# Patient Record
Sex: MALE | Race: BLACK OR AFRICAN AMERICAN | Employment: OTHER | URBAN - METROPOLITAN AREA
[De-identification: names, ages, dates, MRNs, and addresses within clinical notes are randomized per-mention and may not be internally consistent; named-entity substitution may affect disease eponyms.]

---

## 2022-11-16 ENCOUNTER — APPOINTMENT (OUTPATIENT)
Dept: CT IMAGING | Age: 82
End: 2022-11-16
Payer: COMMERCIAL

## 2022-11-16 ENCOUNTER — HOSPITAL ENCOUNTER (EMERGENCY)
Age: 82
Discharge: HOME | End: 2022-11-16
Attending: EMERGENCY MEDICINE
Payer: COMMERCIAL

## 2022-11-16 VITALS
SYSTOLIC BLOOD PRESSURE: 139 MMHG | DIASTOLIC BLOOD PRESSURE: 90 MMHG | OXYGEN SATURATION: 98 % | TEMPERATURE: 97.8 F | HEART RATE: 76 BPM | WEIGHT: 190.4 LBS | RESPIRATION RATE: 21 BRPM
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
## 2022-11-16 DIAGNOSIS — S02.2XXA CLOSED FRACTURE OF NASAL BONE, INITIAL ENCOUNTER: Primary | ICD-10-CM

## 2022-11-16 PROCEDURE — 99284 EMERGENCY DEPT VISIT MOD MDM: CPT

## 2022-11-16 PROCEDURE — 70450 CT HEAD/BRAIN W/O DYE: CPT

## 2022-11-16 PROCEDURE — 70486 CT MAXILLOFACIAL W/O DYE: CPT

## 2022-11-16 PROCEDURE — 72125 CT NECK SPINE W/O DYE: CPT

## 2022-11-16 RX ORDER — CHLORTHALIDONE 25 MG/1
25 TABLET ORAL DAILY
COMMUNITY

## 2022-11-16 RX ORDER — LISINOPRIL 40 MG/1
40 TABLET ORAL DAILY
COMMUNITY

## 2022-11-16 RX ORDER — DORZOLAMIDE HYDROCHLORIDE AND TIMOLOL MALEATE 20; 5 MG/ML; MG/ML
1 SOLUTION/ DROPS OPHTHALMIC 2 TIMES DAILY
COMMUNITY

## 2022-11-16 RX ORDER — HYDRALAZINE HYDROCHLORIDE 10 MG/1
10 TABLET, FILM COATED ORAL 3 TIMES DAILY
COMMUNITY

## 2022-11-16 RX ORDER — FOLIC ACID 1 MG/1
1 TABLET ORAL DAILY
COMMUNITY

## 2022-11-16 RX ORDER — AMLODIPINE BESYLATE 10 MG/1
10 TABLET ORAL DAILY
COMMUNITY

## 2022-11-16 RX ORDER — INSULIN GLARGINE 100 [IU]/ML
INJECTION, SOLUTION SUBCUTANEOUS NIGHTLY
COMMUNITY

## 2022-11-16 ASSESSMENT — PAIN - FUNCTIONAL ASSESSMENT: PAIN_FUNCTIONAL_ASSESSMENT: 0-10

## 2022-11-16 ASSESSMENT — PAIN SCALES - GENERAL: PAINLEVEL_OUTOF10: 0

## 2022-11-16 NOTE — DISCHARGE INSTRUCTIONS
Thank you for choosing [facility] for your emergency care today. We take a lot of pride in the fact that you chose us for your care and we strived to do everything necessary to provide you with excellent medical care. We hope you agree that you received excellent care today. To continue that excellent medical care, the following information very important that you read and understand before you leave the emergency department today. It contains information about:  Your diagnosis  What was done for you in the emergency department  What you can expect from your illness or injury moving forward  The steps you will need to take after leaving the emergency department to help achieve the best possible outcome for your illness or injury. What we did in the Emergency Department Today:     You were seen in the Emergency Department today by Antonio Parker PA-C. You had the following lab abnormalities:  Labs Reviewed - No data to display    If no result appears for the test, then it was within normal limits. If the test is pending, the hospital will shira you if the results are abnormal as soon as the test is completed. You had the following diagnostic imaging:  CT CERVICAL SPINE WO CONTRAST   Final Result      1. No sign of any acute or active sinusitis. Orbits remain intact      2. Comminuted nasal bone fractures with overlying soft tissue swelling. The visualized portions of the mandible and mandibular condyles remain intact and slight anterior translation noted bilaterally, correlate clinically. This could be positional with    partial opening of the mouth. CT scan of the CERVICAL SPINE on 11/16/2022      HISTORY: Neck pain fell, on anticoagulants, nasal bone fractures, evaluate for fracture, disc herniation or spinal canal stenosis.       PROCEDURE: Dose modulation, radiation reducing techniques and iterative reconstruction techniques utilized to reduce radiation exposure with up-to-date CT equipment. CT scan of the cervical spine was performed utilizing contiguous 2.5 cm axial sections with multiple sagittal and coronal reconstructed sequences. These were reviewed under separate computerized work station. COMPARISON: None      FINDINGS: There is abnormal alignment of the cervical vertebral bodies with retrolisthesis of C3 on C4 with remodeling sclerosis and advanced disc space loss. There is borderline retrolisthesis C5 on C6 as well with advanced disc space loss and    spondylosis at C5-6 C6-7 and C7-T1 levels. No discrete vertebral Fracture. The skull base appears intact. Examination of the individual disk spaces reveals no sign of any acute bony defect or cord compression. The C2-C3 disc space level reveals spondylosis and right greater the left facet hypertrophy without central canal compromise. At  C3-C4, , retrolisthesis and mixed spondylotic protrusion noted with right groin left facet hypertrophy. Severe vascular calcifications also noted at the carotid bifurcations. At C4-C5, there is some small central partially calcified spondylotic protrusion and right greater the left facet hypertrophy without significant foraminal compromise. At C5-6, C6-C7 and C7-T1 disc space levels, diffuse endplate spondylosis sclerosis and severely narrowed without definitive acute abnormalities. Uncinate foraminal spurring on the left is noted at C5-6 and left paracentral at C6-7 with facet hypertrophy    bilaterally. IMPRESSION:       1. No acute fracture with advanced disc degeneration, bony overgrowth subchondral sclerosis and deformities as described above   2. Retrolisthesis of C3 on C4 and C5 on C6 with severe disc space loss and spondylosis C3-4 C5-6 C6-7 and C7-T1 levels   3. Facet hypertrophy         CT FACIAL BONES WO CONTRAST   Final Result      1. No sign of any acute or active sinusitis. Orbits remain intact      2.  Comminuted nasal bone fractures with overlying soft tissue swelling. The visualized portions of the mandible and mandibular condyles remain intact and slight anterior translation noted bilaterally, correlate clinically. This could be positional with    partial opening of the mouth. CT scan of the CERVICAL SPINE on 11/16/2022      HISTORY: Neck pain fell, on anticoagulants, nasal bone fractures, evaluate for fracture, disc herniation or spinal canal stenosis. PROCEDURE: Dose modulation, radiation reducing techniques and iterative reconstruction techniques utilized to reduce radiation exposure with up-to-date CT equipment. CT scan of the cervical spine was performed utilizing contiguous 2.5 cm axial sections with multiple sagittal and coronal reconstructed sequences. These were reviewed under separate computerized work station. COMPARISON: None      FINDINGS: There is abnormal alignment of the cervical vertebral bodies with retrolisthesis of C3 on C4 with remodeling sclerosis and advanced disc space loss. There is borderline retrolisthesis C5 on C6 as well with advanced disc space loss and    spondylosis at C5-6 C6-7 and C7-T1 levels. No discrete vertebral Fracture. The skull base appears intact. Examination of the individual disk spaces reveals no sign of any acute bony defect or cord compression. The C2-C3 disc space level reveals spondylosis and right greater the left facet hypertrophy without central canal compromise. At  C3-C4, , retrolisthesis and mixed spondylotic protrusion noted with right groin left facet hypertrophy. Severe vascular calcifications also noted at the carotid bifurcations. At C4-C5, there is some small central partially calcified spondylotic protrusion and right greater the left facet hypertrophy without significant foraminal compromise.       At C5-6, C6-C7 and C7-T1 disc space levels, diffuse endplate spondylosis sclerosis and severely narrowed without definitive acute abnormalities. Uncinate foraminal spurring on the left is noted at C5-6 and left paracentral at C6-7 with facet hypertrophy    bilaterally. IMPRESSION:       1. No acute fracture with advanced disc degeneration, bony overgrowth subchondral sclerosis and deformities as described above   2. Retrolisthesis of C3 on C4 and C5 on C6 with severe disc space loss and spondylosis C3-4 C5-6 C6-7 and C7-T1 levels   3. Facet hypertrophy         CT HEAD WO CONTRAST   Final Result      Moderate cerebral atrophy and ventricular dilation of the brain without acute hemorrhage, edema or hydrocephalus. You were given the following medications during your stay in the Emergency Department:  No orders of the defined types were placed in this encounter. You were diagnosed with the followin. Closed fracture of nasal bone, initial encounter    2. Fall from slip, trip, or stumble, initial encounter        IMPORTANT: If your condition worsens, or your development symptoms that you find concerning and want to have checked out immediately, do not hesitate to return to the Emergency Department. You can call  and have trained Emergency Medical Service providers bring you to the emergency department if you can't do so safely and quickly. They can begin the medical evaluation, on scene, wherever you are located and can begin critical medical care on the way to the emergency.     Signs and symptoms that should always cause concern and be evaluated urgently or in the Emergency Department:  Going Unconscious  Dizziness, lightheadedness like you are going to faint, confusion, loss of balance or new clumsiness  New Seizures  Significant Head Injury  Eye Injuries or new vision changes  Severe Nausea and Vomiting that prevents you from eating, drinking and/or taking your medications  Significant or persistent fevers (Above 100.3 F in babies, over 80 F in adults, as an example)  Chest Pain  Shortness of Breath  Sudden, severe pain  Cuts that won't stop bleeding  Significant [place]  Bleeding during Pregnancy in women  Testicular Pain in men    Your Plan of Care after leaving our Emergency Department     You should read the following instructions before leaving the Emergency Department. If you have any questions about this Plan of Care, please don't hesitate to ask. It is important that you understand this Plan of Care so that you can achieve the best possible outcome from your illness or injury. IMPORTANT INSTRUCTIONS:      You should follow-up with:  Augustine Langley MD  [address] [place] [address] Alaina Villarreal [address]    Schedule an appointment as soon as possible for a visit in 2 weeks  if nose continues to be deformed or if there is any trouble breathing through the nose    [facility], [facility]. Emergency [address]  [phone]  Go to   If symptoms worsen      You should call the number of the providers listed above to schedule follow-up appointments in the timeline recommended or to speak to a healthcare provider. These providers also have on-call clinicians available after hours and on weekends for urgent questions and can be reached by calling the same number. If you feel your issue is an emergency, please don't hesitate to return to the emergency department for evaluation. If you can not safely and quickly get yourself to the emergency department, call 9-1-1 and have trained Emergency Medical Service providers bring you to the emergency department. They can begin the medical evaluation, on scene, wherever you are located and can begin critical medical care on the way to the emergency department while in the ambulance. Even if not listed above, you should always call your Primary Care Provider after a visit to the Emergency Department.  They will want to know what your emergency was so they can best figure out how to continue to coordinate your care moving forward. Your Primary Care Provider is responsible for coordinating and tracking your health and medical care. You should keep them informed regularly of any and all new medical conditions, changes to your medications or other information pertinent to your health. DISCHARGE MEDICATIONS:  The following medications were prescribed for the you during this visit. Take them as directed below:     Current Discharge Medication List          These home medications were modified or discontinued during this visit (be sure you discuss these modifications with your primary care or prescribing physician as soon as possible):   Current Discharge Medication List        Current Discharge Medication List           You should continue to take the following home medications as prescribed by your physician:   Current Discharge Medication List        CONTINUE these medications which have NOT CHANGED    Details   amLODIPine (NORVASC) 10 MG tablet Take 10 mg by mouth daily      apixaban (ELIQUIS) 5 MG TABS tablet Take 5 mg by mouth 2 times daily Take two tablets by mouth twice a day for 3 days.       chlorthalidone (HYGROTON) 25 MG tablet Take 25 mg by mouth daily      empagliflozin (JARDIANCE) 25 MG tablet Take 25 mg by mouth daily      folic acid (FOLVITE) 1 MG tablet Take 1 mg by mouth daily      hydrALAZINE (APRESOLINE) 10 MG tablet Take 10 mg by mouth 3 times daily Take one half tablet by mouth three times a day fr blood pressure/heart      insulin glargine (LANTUS) 100 UNIT/ML injection vial Inject into the skin nightly      lisinopril (PRINIVIL;ZESTRIL) 40 MG tablet Take 40 mg by mouth daily      metFORMIN (GLUCOPHAGE) 1000 MG tablet Take 1,000 mg by mouth 2 times daily (with meals)      dorzolamide-timolol (COSOPT) 22.3-6.8 MG/ML ophthalmic solution 1 drop 2 times daily             Additional important information has been included within this packet, please make sure that you have read this information as it will better help you understand your illness/injury better, the danger signs to watch for and things you can do to improve your condition and health in the future.

## 2022-11-16 NOTE — ED PROVIDER NOTES
ED Attending Attestation Note     Date of evaluation: 11/16/2022    This patient was seen by the advance practice provider. I have seen and examined the patient, agree with the workup, evaluation, management and diagnosis. The care plan has been discussed. My assessment reveals complaints of injuries after fall. Patient has dementia so is unable to provide history. Patient does have swelling present to the nose but is at his mental status baseline per family. Will check imaging.        Robbi Murrieta MD  11/16/22 3422

## 2022-11-17 ASSESSMENT — ENCOUNTER SYMPTOMS
ABDOMINAL PAIN: 0
COLOR CHANGE: 0
DIARRHEA: 0
RHINORRHEA: 0
ABDOMINAL DISTENTION: 0
SHORTNESS OF BREATH: 0
SORE THROAT: 0
NAUSEA: 0
VOMITING: 0
WHEEZING: 0
EYE PAIN: 0
CHEST TIGHTNESS: 0
COUGH: 0
TROUBLE SWALLOWING: 0

## 2022-11-17 NOTE — ED PROVIDER NOTES
[address] [facility]          PHYSICIAN ASSISTANT NOTE       Date of evaluation: 11/16/2022    Chief Complaint     Fall (Pt states he was getting up trying to use the urinal and stumbled on it and fell. Abrasion on the left side of the bridge of the nose.)      History of Present Illness     Betsy Snellen is a 79 y.o. male with a past medical history as noted below who presents to the Emergency Department with a complaint of injuries from a fall. The patient is a resident of a skilled nursing facility. The patient reports that he was attempting to get up to use the bathroom when he stumbled over the urinal and fell. The fall was unwitnessed. The patient reports that he fell forward, striking his face and head against the wall, but was able to catch himself from falling all the way to the ground. He sustained a laceration over his nose with a nosebleed. Facility staff was notified who activated EMS as the patient is on Eliquis for the treatment of recent DVT provoked by fall. The patient denies any loss of consciousness. Hearing loss during EMS transport, the epistaxis stopped on its own. Denies any complaints of pain at this time. No recent chest pain, shortness of breath, lightheadedness, dizziness or disequilibrium. No recent fevers, chills, sweats or other constitutional symptoms. Review of Systems     Review of Systems   Constitutional:  Negative for chills, diaphoresis, fatigue and fever. HENT:  Positive for nosebleeds. Negative for congestion, postnasal drip, rhinorrhea, sore throat and trouble swallowing. Eyes:  Negative for pain and visual disturbance. Respiratory:  Negative for cough, chest tightness, shortness of breath and wheezing. Cardiovascular:  Negative for chest pain, palpitations and leg swelling. Gastrointestinal:  Negative for abdominal distention, abdominal pain, diarrhea, nausea and vomiting.    Endocrine: Negative for polydipsia and polyuria. Genitourinary:  Negative for dysuria. Musculoskeletal:  Negative for myalgias, neck pain and neck stiffness. Skin:  Negative for color change, pallor and rash. Neurological:  Negative for dizziness, seizures, weakness, light-headedness and headaches. Hematological:  Does not bruise/bleed easily. Psychiatric/Behavioral:  Negative for confusion, hallucinations, self-injury and suicidal ideas. All other systems reviewed and are negative. Physical Exam     INITIAL VITALS: BP: (!) 145/88, Temp: 97.8 °F (36.6 °C), Heart Rate: 77, Resp: 24, SpO2: 100 %     Nursing note and vitals reviewed. Physical Exam  Constitutional:       General: He is not in acute distress. HENT:      Head: Contusion and laceration present. No raccoon eyes or Tom's sign. Right Ear: Tympanic membrane and ear canal normal.      Left Ear: Tympanic membrane and ear canal normal.      Nose: Nasal deformity, signs of injury and laceration present. No septal deviation, mucosal edema or rhinorrhea. Right Nostril: No epistaxis or septal hematoma. Left Nostril: Epistaxis (Currently hemostatic) present. No septal hematoma. Mouth/Throat:      Mouth: No injury. Eyes:      Extraocular Movements: Extraocular movements intact. Pupils: Pupils are equal, round, and reactive to light. Cardiovascular:      Rate and Rhythm: Normal rate and regular rhythm. Pulmonary:      Breath sounds: No decreased air movement. No decreased breath sounds. Musculoskeletal:      Right shoulder: Normal.      Left shoulder: Normal.      Right upper arm: Normal.      Left upper arm: Normal.      Right elbow: Normal.      Left elbow: Normal.      Right forearm: Normal.      Left forearm: Normal.      Right wrist: Normal.      Left wrist: Normal.      Right hand: Normal.      Left hand: Normal.      Cervical back: No edema, signs of trauma, tenderness, bony tenderness or crepitus. No pain with movement.       Thoracic back: No edema, signs of trauma, tenderness or bony tenderness. Normal range of motion. Lumbar back: No edema, signs of trauma, tenderness or bony tenderness. Right hip: Normal.      Left hip: Normal.      Right upper leg: Normal.      Left upper leg: Normal.      Right knee: Normal.      Left knee: Normal.      Right lower leg: Normal.      Left lower leg: Normal.      Right ankle: Normal.      Right Achilles Tendon: Normal.      Left ankle: Normal.      Left Achilles Tendon: Normal.      Right foot: Normal.      Left foot: Normal.   Skin:     Findings: No bruising, burn, signs of injury or laceration. Neurological:      Mental Status: He is alert. GCS: GCS eye subscore is 4. GCS verbal subscore is 5. GCS motor subscore is 6. Sensory: No sensory deficit. Motor: No weakness or abnormal muscle tone. Deep Tendon Reflexes: Babinski sign absent on the right side. Babinski sign absent on the left side. Procedures     N/A    MEDICAL DECISION MAKING     Mercedes Shanks is admitted to the Emergency Department for evaluation of his chief complaint as described in the history of present illness. Complete history and physical was performed by me and my attending. Nursing notes, past medical history, surgical history, family history and social history were reviewed and addressed in the HPI. Jimmie Benavides is a 79 y.o. male who presents to the emergency department with a complaint of injuries from fall. The patient sustained a mechanical fall while walking to the bathroom, tripping over a urinal.  Patient remembers striking his head and face against the wall, but was able to stop himself from falling to the ground. Reported bleeding from the nose. The patient is currently on Eliquis for management of DVT provoked by prior fall. He also has a history of atrial fibrillation/flutter. Denies any complaints on arrival to the emergency department.     On emergency department arrival, the patient is hemodynamically stable and within normal limits. The patient is extremely hard of hearing, but does have hearing aids in bilaterally. He answers questions appropriately when he can hear them clearly. He presents with his daughter who was notified by the nursing facility staff of the patient's fall. She reports that the patient is acting at his baseline mentally. Physical examination reveals a laceration to the left medial aspect of the bridge of the nose with nasal deformity and evidence of recent epistaxis of the left naris. There is no evidence of septal hematoma. Dentition intact and without injury. No other cranial or facial injuries noted on physical examination. Head to toe traumatic evaluation demonstrates no further injuries or complaints of pain. As the patient is currently on an anticoagulant medication, a CT scan of the head and cervical spine were performed. In addition, given the nasal deformity and tenderness in the region, a CT scan of the facial bones was ordered for evaluation completion. CT of the head and cervical spine demonstrate no acute intracranial abnormalities, osseous or articular abnormalities. CT scan of the facial bones notes a comminuted bilateral nasal bone fracture. .  The options for management of nasal bone fracture was discussed with both the patient and his daughter and any treatment will be delayed for repeat evaluation by otolaryngology once swelling has resolved. On repeat evaluation, the patient continues to be at his neurological baseline per family and demonstrates no additional complaints or deficits. I discussed this plan at length the patient who verbalizes understanding and is in agreement. The patient is currently stable and will be discharged back to the skilled nursing facility for continued care. Please see patient's AVS for additional discharge instructions.     The patient was seen and evaluated by myself and the attending physician, Riya Sanders MD, who agrees with my assessment, treatment and plan. Clinical Impression     1. Closed fracture of nasal bone, initial encounter    2. Fall from slip, trip, or stumble, initial encounter        Disposition     DISPOSITION Decision To Discharge 11/16/2022 03:16:43 AM        Sina Nunez PA-C  7:52 AM    Relevant History and Diagnostic Information     Past Medical, Surgical, Family, and Social History     He has no past medical history on file. He has no past surgical history on file. His family history is not on file. He reports that he has been smoking cigarettes. He has been smoking an average of .3 packs per day. He has never used smokeless tobacco. He reports that he does not currently use alcohol. He reports that he does not use drugs. Medications     Discharge Medication List as of 11/16/2022  4:09 AM        CONTINUE these medications which have NOT CHANGED    Details   amLODIPine (NORVASC) 10 MG tablet Take 10 mg by mouth dailyHistorical Med      apixaban (ELIQUIS) 5 MG TABS tablet Take 5 mg by mouth 2 times daily Take two tablets by mouth twice a day for 3 days. Historical Med      chlorthalidone (HYGROTON) 25 MG tablet Take 25 mg by mouth dailyHistorical Med      empagliflozin (JARDIANCE) 25 MG tablet Take 25 mg by mouth dailyHistorical Med      folic acid (FOLVITE) 1 MG tablet Take 1 mg by mouth dailyHistorical Med      hydrALAZINE (APRESOLINE) 10 MG tablet Take 10 mg by mouth 3 times daily Take one half tablet by mouth three times a day fr blood pressure/heartHistorical Med      insulin glargine (LANTUS) 100 UNIT/ML injection vial Inject into the skin nightlyHistorical Med      lisinopril (PRINIVIL;ZESTRIL) 40 MG tablet Take 40 mg by mouth dailyHistorical Med      metFORMIN (GLUCOPHAGE) 1000 MG tablet Take 1,000 mg by mouth 2 times daily (with meals)Historical Med      dorzolamide-timolol (COSOPT) 22.3-6.8 MG/ML ophthalmic solution 1 drop 2 times dailyHistorical Med Allergies     He has No Known Allergies. ED Course     Nursing Notes, Past Medical Hx, Past Surgical Hx, Social Hx,Allergies, and Family Hx were reviewed. Patient was given the following medications:  No orders of the defined types were placed in this encounter. Diagnostic Results     RECENT VITALS:  BP: (!) 139/90,Temp: 97.8 °F (36.6 °C), Heart Rate: 76, Resp: 21, SpO2: 98 %     RADIOLOGY:  CT CERVICAL SPINE WO CONTRAST   Final Result      1. No sign of any acute or active sinusitis. Orbits remain intact      2. Comminuted nasal bone fractures with overlying soft tissue swelling. The visualized portions of the mandible and mandibular condyles remain intact and slight anterior translation noted bilaterally, correlate clinically. This could be positional with    partial opening of the mouth. CT scan of the CERVICAL SPINE on 11/16/2022      HISTORY: Neck pain fell, on anticoagulants, nasal bone fractures, evaluate for fracture, disc herniation or spinal canal stenosis. PROCEDURE: Dose modulation, radiation reducing techniques and iterative reconstruction techniques utilized to reduce radiation exposure with up-to-date CT equipment. CT scan of the cervical spine was performed utilizing contiguous 2.5 cm axial sections with multiple sagittal and coronal reconstructed sequences. These were reviewed under separate computerized work station. COMPARISON: None      FINDINGS: There is abnormal alignment of the cervical vertebral bodies with retrolisthesis of C3 on C4 with remodeling sclerosis and advanced disc space loss. There is borderline retrolisthesis C5 on C6 as well with advanced disc space loss and    spondylosis at C5-6 C6-7 and C7-T1 levels. No discrete vertebral Fracture. The skull base appears intact. Examination of the individual disk spaces reveals no sign of any acute bony defect or cord compression.        The C2-C3 disc space level reveals spondylosis and right greater the left facet hypertrophy without central canal compromise. At  C3-C4, , retrolisthesis and mixed spondylotic protrusion noted with right groin left facet hypertrophy. Severe vascular calcifications also noted at the carotid bifurcations. At C4-C5, there is some small central partially calcified spondylotic protrusion and right greater the left facet hypertrophy without significant foraminal compromise. At C5-6, C6-C7 and C7-T1 disc space levels, diffuse endplate spondylosis sclerosis and severely narrowed without definitive acute abnormalities. Uncinate foraminal spurring on the left is noted at C5-6 and left paracentral at C6-7 with facet hypertrophy    bilaterally. IMPRESSION:       1. No acute fracture with advanced disc degeneration, bony overgrowth subchondral sclerosis and deformities as described above   2. Retrolisthesis of C3 on C4 and C5 on C6 with severe disc space loss and spondylosis C3-4 C5-6 C6-7 and C7-T1 levels   3. Facet hypertrophy         CT FACIAL BONES WO CONTRAST   Final Result      1. No sign of any acute or active sinusitis. Orbits remain intact      2. Comminuted nasal bone fractures with overlying soft tissue swelling. The visualized portions of the mandible and mandibular condyles remain intact and slight anterior translation noted bilaterally, correlate clinically. This could be positional with    partial opening of the mouth. CT scan of the CERVICAL SPINE on 11/16/2022      HISTORY: Neck pain fell, on anticoagulants, nasal bone fractures, evaluate for fracture, disc herniation or spinal canal stenosis. PROCEDURE: Dose modulation, radiation reducing techniques and iterative reconstruction techniques utilized to reduce radiation exposure with up-to-date CT equipment. CT scan of the cervical spine was performed utilizing contiguous 2.5 cm axial sections with multiple sagittal and coronal reconstructed sequences.  These were reviewed under separate computerized work station. COMPARISON: None      FINDINGS: There is abnormal alignment of the cervical vertebral bodies with retrolisthesis of C3 on C4 with remodeling sclerosis and advanced disc space loss. There is borderline retrolisthesis C5 on C6 as well with advanced disc space loss and    spondylosis at C5-6 C6-7 and C7-T1 levels. No discrete vertebral Fracture. The skull base appears intact. Examination of the individual disk spaces reveals no sign of any acute bony defect or cord compression. The C2-C3 disc space level reveals spondylosis and right greater the left facet hypertrophy without central canal compromise. At  C3-C4, , retrolisthesis and mixed spondylotic protrusion noted with right groin left facet hypertrophy. Severe vascular calcifications also noted at the carotid bifurcations. At C4-C5, there is some small central partially calcified spondylotic protrusion and right greater the left facet hypertrophy without significant foraminal compromise. At C5-6, C6-C7 and C7-T1 disc space levels, diffuse endplate spondylosis sclerosis and severely narrowed without definitive acute abnormalities. Uncinate foraminal spurring on the left is noted at C5-6 and left paracentral at C6-7 with facet hypertrophy    bilaterally. IMPRESSION:       1. No acute fracture with advanced disc degeneration, bony overgrowth subchondral sclerosis and deformities as described above   2. Retrolisthesis of C3 on C4 and C5 on C6 with severe disc space loss and spondylosis C3-4 C5-6 C6-7 and C7-T1 levels   3. Facet hypertrophy         CT HEAD WO CONTRAST   Final Result      Moderate cerebral atrophy and ventricular dilation of the brain without acute hemorrhage, edema or hydrocephalus. LABS:   No results found for this visit on 11/16/22.     CONSULTS:  None    PATIENT REFERRED TO:  Harmony Servin MD  [facility] [address] [address]  [phone]    Schedule an appointment as soon as possible for a visit in 2 weeks  if nose continues to be deformed or if there is any trouble breathing through the nose    [facility] Emergency [facility] [address] [facility] [address]  [address] [facility]  Go to   If symptoms worsen      DISCHARGE MEDICATIONS:  Discharge Medication List as of 11/16/2022  4:09 AM             [address] [place] [address]  11/17/22 [id number]

## 2023-11-25 ENCOUNTER — HOSPITAL ENCOUNTER (EMERGENCY)
Age: 83
Discharge: HOME | End: 2023-11-25
Attending: EMERGENCY MEDICINE
Payer: COMMERCIAL

## 2023-11-25 ENCOUNTER — APPOINTMENT (OUTPATIENT)
Dept: CT IMAGING | Age: 83
End: 2023-11-25
Payer: COMMERCIAL

## 2023-11-25 VITALS
RESPIRATION RATE: 16 BRPM | HEART RATE: 76 BPM | SYSTOLIC BLOOD PRESSURE: 131 MMHG | DIASTOLIC BLOOD PRESSURE: 101 MMHG | OXYGEN SATURATION: 93 % | TEMPERATURE: 98.1 F

## 2023-11-25 DIAGNOSIS — R46.89 AGGRESSIVE BEHAVIOR: Primary | ICD-10-CM

## 2023-11-25 LAB
ANION GAP SERPL CALCULATED.3IONS-SCNC: 14 MMOL/L (ref 3–16)
BASOPHILS # BLD: 0.1 K/UL (ref 0–0.2)
BASOPHILS NFR BLD: 0.8 %
BILIRUB UR QL STRIP.AUTO: NEGATIVE
BUN SERPL-MCNC: 22 MG/DL (ref 7–20)
CALCIUM SERPL-MCNC: 9.2 MG/DL (ref 8.3–10.6)
CHLORIDE SERPL-SCNC: 102 MMOL/L (ref 99–110)
CLARITY UR: CLEAR
CO2 SERPL-SCNC: 19 MMOL/L (ref 21–32)
COLOR UR: YELLOW
CREAT SERPL-MCNC: 1.2 MG/DL (ref 0.8–1.3)
DEPRECATED RDW RBC AUTO: 16.5 % (ref 12.4–15.4)
EKG ATRIAL RATE: 78 BPM
EKG DIAGNOSIS: NORMAL
EKG Q-T INTERVAL: 386 MS
EKG QRS DURATION: 72 MS
EKG QTC CALCULATION (BAZETT): 431 MS
EKG R AXIS: -8 DEGREES
EKG T AXIS: 17 DEGREES
EKG VENTRICULAR RATE: 75 BPM
EOSINOPHIL # BLD: 0 K/UL (ref 0–0.6)
EOSINOPHIL NFR BLD: 0.5 %
GFR SERPLBLD CREATININE-BSD FMLA CKD-EPI: 60 ML/MIN/{1.73_M2}
GLUCOSE SERPL-MCNC: 169 MG/DL (ref 70–99)
GLUCOSE UR STRIP.AUTO-MCNC: >=1000 MG/DL
HCT VFR BLD AUTO: 40.5 % (ref 40.5–52.5)
HGB BLD-MCNC: 13.3 G/DL (ref 13.5–17.5)
HGB UR QL STRIP.AUTO: NEGATIVE
KETONES UR STRIP.AUTO-MCNC: ABNORMAL MG/DL
LEUKOCYTE ESTERASE UR QL STRIP.AUTO: NEGATIVE
LYMPHOCYTES # BLD: 1.4 K/UL (ref 1–5.1)
LYMPHOCYTES NFR BLD: 17.6 %
MCH RBC QN AUTO: 29.5 PG (ref 26–34)
MCHC RBC AUTO-ENTMCNC: 32.7 G/DL (ref 31–36)
MCV RBC AUTO: 90.1 FL (ref 80–100)
MONOCYTES # BLD: 0.9 K/UL (ref 0–1.3)
MONOCYTES NFR BLD: 11.4 %
NEUTROPHILS # BLD: 5.4 K/UL (ref 1.7–7.7)
NEUTROPHILS NFR BLD: 69.7 %
NITRITE UR QL STRIP.AUTO: NEGATIVE
PH UR STRIP.AUTO: 5 [PH] (ref 5–8)
PLATELET # BLD AUTO: 198 K/UL (ref 135–450)
PMV BLD AUTO: 8.9 FL (ref 5–10.5)
POTASSIUM SERPL-SCNC: 4.5 MMOL/L (ref 3.5–5.1)
PROT UR STRIP.AUTO-MCNC: NEGATIVE MG/DL
RBC # BLD AUTO: 4.5 M/UL (ref 4.2–5.9)
SODIUM SERPL-SCNC: 135 MMOL/L (ref 136–145)
SP GR UR STRIP.AUTO: >=1.03 (ref 1–1.03)
UA COMPLETE W REFLEX CULTURE PNL UR: ABNORMAL
UA DIPSTICK W REFLEX MICRO PNL UR: ABNORMAL
URN SPEC COLLECT METH UR: ABNORMAL
UROBILINOGEN UR STRIP-ACNC: 1 E.U./DL
WBC # BLD AUTO: 7.7 K/UL (ref 4–11)

## 2023-11-25 PROCEDURE — 6370000000 HC RX 637 (ALT 250 FOR IP): Performed by: EMERGENCY MEDICINE

## 2023-11-25 PROCEDURE — 93005 ELECTROCARDIOGRAM TRACING: CPT | Performed by: EMERGENCY MEDICINE

## 2023-11-25 PROCEDURE — 81003 URINALYSIS AUTO W/O SCOPE: CPT

## 2023-11-25 PROCEDURE — 80048 BASIC METABOLIC PNL TOTAL CA: CPT

## 2023-11-25 PROCEDURE — 93010 ELECTROCARDIOGRAM REPORT: CPT | Performed by: INTERNAL MEDICINE

## 2023-11-25 PROCEDURE — 99284 EMERGENCY DEPT VISIT MOD MDM: CPT

## 2023-11-25 PROCEDURE — 70450 CT HEAD/BRAIN W/O DYE: CPT

## 2023-11-25 PROCEDURE — 85025 COMPLETE CBC W/AUTO DIFF WBC: CPT

## 2023-11-25 RX ORDER — OLANZAPINE 5 MG/1
10 TABLET ORAL ONCE
Status: COMPLETED | OUTPATIENT
Start: 2023-11-25 | End: 2023-11-25

## 2023-11-25 RX ADMIN — OLANZAPINE 10 MG: 5 TABLET, FILM COATED ORAL at 00:46

## 2023-11-25 ASSESSMENT — ENCOUNTER SYMPTOMS
ABDOMINAL PAIN: 0
COUGH: 0
DIARRHEA: 0
VOMITING: 0
SHORTNESS OF BREATH: 0
NAUSEA: 0
RHINORRHEA: 0

## 2023-11-25 ASSESSMENT — LIFESTYLE VARIABLES
HOW MANY STANDARD DRINKS CONTAINING ALCOHOL DO YOU HAVE ON A TYPICAL DAY: PATIENT DOES NOT DRINK
HOW OFTEN DO YOU HAVE A DRINK CONTAINING ALCOHOL: NEVER

## 2023-11-25 ASSESSMENT — PAIN - FUNCTIONAL ASSESSMENT: PAIN_FUNCTIONAL_ASSESSMENT: NONE - DENIES PAIN

## 2023-11-25 NOTE — ED PROVIDER NOTES
[facility]        Pt Name: Alicia Khan  MRN: [id number]  [address] [facility] [address] 1940  Date of evaluation: 11/25/2023  Provider: Tete Oswald PA-C  PCP: Unknown, Provider, DO  Note Started: 1:37 AM EST 11/25/23       I have seen and evaluated this patient with my supervising physician Rafael Patel MD.      [address]       Chief Complaint   Patient presents with    Altered Mental Status     Pt cc ams, pt brought I from [place] side for altered mental status and being combative with staff, pt calm in triage and denies anything wrong. HISTORY OF PRESENT ILLNESS: 1 or more Elements     History From: Daughter at bedside  Limitations to history : History of dementia    Alicia Khan is a 79 y.o. male who presents to the emergency department today for evaluation for concerns of aggressive behavior. The patient does have a history of dementia, and apparently was exhibiting aggressive behavior towards nursing home staff. Per EMS the patient was calm the ride over, and patient has been acting at his baseline here per the daughter. The daughter reports that when the patient is unable to call her that he will become agitated, she states that he was attempted to call her, and he states that when the staff came in to help he thought that they were taking away his cell phone and this made him agitated. Patient has not had any fall, or head injury. There is not been any recent illnesses including fever, cough, vomiting or diarrhea. Again patient is acting at baseline    Nursing Notes were all reviewed and agreed with or any disagreements were addressed in the HPI. REVIEW OF SYSTEMS :      Review of Systems   Constitutional:  Negative for activity change, appetite change, chills and fever. HENT:  Negative for congestion and rhinorrhea. Respiratory:  Negative for cough and shortness of breath.     Cardiovascular:  Negative for components within normal limits       When ordered only abnormal lab results are displayed. All other labs were within normal range or not returned as of this dictation. EKG: When ordered, EKG's are interpreted by the Emergency Department Physician in the absence of a cardiologist.  Please see their note for interpretation of EKG. RADIOLOGY:   Non-plain film images such as CT, Ultrasound and MRI are read by the radiologist. Plain radiographic images are visualized and preliminarily interpreted by the ED Provider with the below findings:        Interpretation per the Radiologist below, if available at the time of this note:    CT HEAD WO CONTRAST   Final Result   No acute intracranial hemorrhage or extra-axial fluid collection. No mass   effect or midline shift. Generalized volume loss with prominence of the ventricles and sulci. Size of   the lateral ventricles is likely due to the degree of volume loss. There are chronic ischemic changes in the white matter and an area of old   encephalomalacia. No convincing area of acute territorial infarct. No results found. No results found. PROCEDURES   Unless otherwise noted below, none     Procedures    CRITICAL CARE TIME (.cctime)       PAST MEDICAL HISTORY      has a past medical history of Atrial fibrillation ([address]), BPH (benign prostatic hyperplasia), Central retinal artery occlusion, Diabetes mellitus ([address]), Diabetic nephropathy ([address]), Diabetic retinopathy ([address]), DVT (deep venous thrombosis) ([address]), Falls frequently, Hearing loss, Hyperlipidemia, Hypertension, and Hypertensive heart disease without heart failure.      EMERGENCY DEPARTMENT COURSE and DIFFERENTIAL DIAGNOSIS/MDM:   Vitals:    Vitals:    11/25/23 0130 11/25/23 0200 11/25/23 0230 11/25/23 0300   BP: (!) 144/90 (!) 140/55 (!) 160/95 (!) 131/101   Pulse:       Resp:       Temp:       SpO2: 99% 95% 98% 93%       Patient was given the following medications:  Medications

## 2024-01-18 ENCOUNTER — APPOINTMENT (OUTPATIENT)
Dept: GENERAL RADIOLOGY | Age: 84
End: 2024-01-18
Payer: OTHER GOVERNMENT

## 2024-01-18 ENCOUNTER — HOSPITAL ENCOUNTER (INPATIENT)
Age: 84
LOS: 1 days | End: 2024-01-19
Attending: EMERGENCY MEDICINE | Admitting: STUDENT IN AN ORGANIZED HEALTH CARE EDUCATION/TRAINING PROGRAM
Payer: OTHER GOVERNMENT

## 2024-01-18 ENCOUNTER — APPOINTMENT (OUTPATIENT)
Dept: CT IMAGING | Age: 84
End: 2024-01-18
Payer: OTHER GOVERNMENT

## 2024-01-18 DIAGNOSIS — A41.9 SEPTIC SHOCK (HCC): Primary | ICD-10-CM

## 2024-01-18 DIAGNOSIS — R40.0 SOMNOLENCE: ICD-10-CM

## 2024-01-18 DIAGNOSIS — R65.21 SEPTIC SHOCK (HCC): Primary | ICD-10-CM

## 2024-01-18 DIAGNOSIS — I96 GANGRENE OF RIGHT FOOT (HCC): ICD-10-CM

## 2024-01-18 DIAGNOSIS — M86.9 OSTEOMYELITIS OF GREAT TOE OF RIGHT FOOT (HCC): ICD-10-CM

## 2024-01-18 LAB
ALBUMIN SERPL-MCNC: 2.2 G/DL (ref 3.4–5)
ALBUMIN SERPL-MCNC: 2.5 G/DL (ref 3.4–5)
ALBUMIN/GLOB SERPL: 0.6 {RATIO} (ref 1.1–2.2)
ALBUMIN/GLOB SERPL: 0.9 {RATIO} (ref 1.1–2.2)
ALP SERPL-CCNC: 118 U/L (ref 40–129)
ALP SERPL-CCNC: 142 U/L (ref 40–129)
ALT SERPL-CCNC: 23 U/L (ref 10–40)
ALT SERPL-CCNC: 24 U/L (ref 10–40)
AMMONIA PLAS-SCNC: 20 UMOL/L (ref 16–60)
AMORPHOUS: PRESENT
ANION GAP SERPL CALCULATED.3IONS-SCNC: 16 MMOL/L (ref 3–16)
ANION GAP SERPL CALCULATED.3IONS-SCNC: 16 MMOL/L (ref 3–16)
APTT BLD: 41.8 SEC (ref 22.7–35.9)
AST SERPL-CCNC: 37 U/L (ref 15–37)
AST SERPL-CCNC: 39 U/L (ref 15–37)
BACTERIA URNS QL MICRO: ABNORMAL /HPF
BASE EXCESS BLDA CALC-SCNC: -9 MMOL/L (ref -3–3)
BASE EXCESS BLDV CALC-SCNC: -5.7 MMOL/L
BASOPHILS # BLD: 0 K/UL (ref 0–0.2)
BASOPHILS NFR BLD: 0.1 %
BILIRUB SERPL-MCNC: 0.6 MG/DL (ref 0–1)
BILIRUB SERPL-MCNC: 0.6 MG/DL (ref 0–1)
BILIRUB UR QL STRIP.AUTO: NEGATIVE
BUN SERPL-MCNC: 43 MG/DL (ref 7–20)
BUN SERPL-MCNC: 45 MG/DL (ref 7–20)
CALCIUM SERPL-MCNC: 8 MG/DL (ref 8.3–10.6)
CALCIUM SERPL-MCNC: 8.2 MG/DL (ref 8.3–10.6)
CHLORIDE SERPL-SCNC: 110 MMOL/L (ref 99–110)
CHLORIDE SERPL-SCNC: 114 MMOL/L (ref 99–110)
CK SERPL-CCNC: 303 U/L (ref 39–308)
CLARITY UR: ABNORMAL
CO2 BLDA-SCNC: 17 MMOL/L
CO2 BLDV-SCNC: 21 MMOL/L
CO2 SERPL-SCNC: 19 MMOL/L (ref 21–32)
CO2 SERPL-SCNC: 20 MMOL/L (ref 21–32)
COHGB MFR BLDV: 1.2 %
COLOR UR: ABNORMAL
CREAT SERPL-MCNC: 1.5 MG/DL (ref 0.8–1.3)
CREAT SERPL-MCNC: 1.8 MG/DL (ref 0.8–1.3)
CRP SERPL-MCNC: 255 MG/L (ref 0–5.1)
DEPRECATED RDW RBC AUTO: 17.2 % (ref 12.4–15.4)
EKG ATRIAL RATE: 178 BPM
EKG DIAGNOSIS: NORMAL
EKG Q-T INTERVAL: 450 MS
EKG QRS DURATION: 90 MS
EKG QTC CALCULATION (BAZETT): 418 MS
EKG R AXIS: -18 DEGREES
EKG T AXIS: 17 DEGREES
EKG VENTRICULAR RATE: 52 BPM
EOSINOPHIL # BLD: 0 K/UL (ref 0–0.6)
EOSINOPHIL NFR BLD: 0 %
EPI CELLS #/AREA URNS AUTO: 7 /HPF (ref 0–5)
ERYTHROCYTE [SEDIMENTATION RATE] IN BLOOD BY WESTERGREN METHOD: 46 MM/HR (ref 0–20)
FLUAV RNA UPPER RESP QL NAA+PROBE: NEGATIVE
FLUBV AG NPH QL: NEGATIVE
GFR SERPLBLD CREATININE-BSD FMLA CKD-EPI: 37 ML/MIN/{1.73_M2}
GFR SERPLBLD CREATININE-BSD FMLA CKD-EPI: 46 ML/MIN/{1.73_M2}
GLUCOSE BLD-MCNC: 106 MG/DL (ref 70–99)
GLUCOSE SERPL-MCNC: 129 MG/DL (ref 70–99)
GLUCOSE SERPL-MCNC: 188 MG/DL (ref 70–99)
GLUCOSE UR STRIP.AUTO-MCNC: >=1000 MG/DL
HCO3 BLDA-SCNC: 16.4 MMOL/L (ref 21–29)
HCO3 BLDV-SCNC: 20 MMOL/L (ref 23–29)
HCT VFR BLD AUTO: 41.8 % (ref 40.5–52.5)
HGB BLD-MCNC: 13.1 G/DL (ref 13.5–17.5)
HGB UR QL STRIP.AUTO: ABNORMAL
HYALINE CASTS #/AREA URNS AUTO: 2 /LPF (ref 0–8)
KETONES UR STRIP.AUTO-MCNC: ABNORMAL MG/DL
LACTATE BLDV-SCNC: 2 MMOL/L (ref 0.4–2)
LACTATE BLDV-SCNC: 2.3 MMOL/L (ref 0.4–2)
LACTATE BLDV-SCNC: 3.8 MMOL/L (ref 0.4–2)
LEUKOCYTE ESTERASE UR QL STRIP.AUTO: ABNORMAL
LYMPHOCYTES # BLD: 0.1 K/UL (ref 1–5.1)
LYMPHOCYTES NFR BLD: 0.4 %
MAGNESIUM SERPL-MCNC: 1.9 MG/DL (ref 1.8–2.4)
MCH RBC QN AUTO: 27.2 PG (ref 26–34)
MCHC RBC AUTO-ENTMCNC: 31.5 G/DL (ref 31–36)
MCV RBC AUTO: 86.3 FL (ref 80–100)
METHGB MFR BLDV: 0.1 %
MONOCYTES # BLD: 0.1 K/UL (ref 0–1.3)
MONOCYTES NFR BLD: 0.4 %
MUCUS: PRESENT
NEUTROPHILS # BLD: 18.4 K/UL (ref 1.7–7.7)
NEUTROPHILS NFR BLD: 99.1 %
NITRITE UR QL STRIP.AUTO: NEGATIVE
O2 THERAPY: ABNORMAL
PCO2 BLDA: 27.6 MM HG (ref 35–45)
PCO2 BLDV: 39.9 MMHG (ref 40–50)
PERFORMED ON: ABNORMAL
PH BLDA: 7.38 [PH] (ref 7.35–7.45)
PH BLDV: 7.31 [PH] (ref 7.35–7.45)
PH UR STRIP.AUTO: 5 [PH] (ref 5–8)
PLATELET # BLD AUTO: 123 K/UL (ref 135–450)
PMV BLD AUTO: 9.3 FL (ref 5–10.5)
PO2 BLDA: 62.5 MM HG (ref 75–108)
PO2 BLDV: 38 MMHG
POC SAMPLE TYPE: ABNORMAL
POTASSIUM SERPL-SCNC: 3.5 MMOL/L (ref 3.5–5.1)
POTASSIUM SERPL-SCNC: 4.2 MMOL/L (ref 3.5–5.1)
PROT SERPL-MCNC: 5.4 G/DL (ref 6.4–8.2)
PROT SERPL-MCNC: 6 G/DL (ref 6.4–8.2)
PROT UR STRIP.AUTO-MCNC: ABNORMAL MG/DL
RBC # BLD AUTO: 4.84 M/UL (ref 4.2–5.9)
RBC CLUMPS #/AREA URNS AUTO: 21 /HPF (ref 0–4)
S PYO AG THROAT QL: NEGATIVE
SAO2 % BLDA: 92 % (ref 93–100)
SAO2 % BLDV: 60 %
SARS-COV-2 RDRP RESP QL NAA+PROBE: NOT DETECTED
SODIUM SERPL-SCNC: 146 MMOL/L (ref 136–145)
SODIUM SERPL-SCNC: 149 MMOL/L (ref 136–145)
SP GR UR STRIP.AUTO: 1.03 (ref 1–1.03)
TROPONIN, HIGH SENSITIVITY: 72 NG/L (ref 0–22)
TROPONIN, HIGH SENSITIVITY: 80 NG/L (ref 0–22)
TSH SERPL DL<=0.005 MIU/L-ACNC: 1.95 UIU/ML (ref 0.27–4.2)
UA COMPLETE W REFLEX CULTURE PNL UR: ABNORMAL
UA DIPSTICK W REFLEX MICRO PNL UR: YES
URN SPEC COLLECT METH UR: ABNORMAL
UROBILINOGEN UR STRIP-ACNC: 1 E.U./DL
WBC # BLD AUTO: 18.6 K/UL (ref 4–11)
WBC #/AREA URNS AUTO: 8 /HPF (ref 0–5)

## 2024-01-18 PROCEDURE — 87040 BLOOD CULTURE FOR BACTERIA: CPT

## 2024-01-18 PROCEDURE — 80053 COMPREHEN METABOLIC PANEL: CPT

## 2024-01-18 PROCEDURE — 82550 ASSAY OF CK (CPK): CPT

## 2024-01-18 PROCEDURE — 87635 SARS-COV-2 COVID-19 AMP PRB: CPT

## 2024-01-18 PROCEDURE — 2580000003 HC RX 258: Performed by: STUDENT IN AN ORGANIZED HEALTH CARE EDUCATION/TRAINING PROGRAM

## 2024-01-18 PROCEDURE — 87081 CULTURE SCREEN ONLY: CPT

## 2024-01-18 PROCEDURE — 87077 CULTURE AEROBIC IDENTIFY: CPT

## 2024-01-18 PROCEDURE — 2500000003 HC RX 250 WO HCPCS: Performed by: EMERGENCY MEDICINE

## 2024-01-18 PROCEDURE — 71045 X-RAY EXAM CHEST 1 VIEW: CPT

## 2024-01-18 PROCEDURE — 2500000003 HC RX 250 WO HCPCS: Performed by: STUDENT IN AN ORGANIZED HEALTH CARE EDUCATION/TRAINING PROGRAM

## 2024-01-18 PROCEDURE — 87880 STREP A ASSAY W/OPTIC: CPT

## 2024-01-18 PROCEDURE — 82803 BLOOD GASES ANY COMBINATION: CPT

## 2024-01-18 PROCEDURE — 96367 TX/PROPH/DG ADDL SEQ IV INF: CPT

## 2024-01-18 PROCEDURE — 93005 ELECTROCARDIOGRAM TRACING: CPT | Performed by: EMERGENCY MEDICINE

## 2024-01-18 PROCEDURE — 3E040XZ INTRODUCTION OF VASOPRESSOR INTO CENTRAL VEIN, OPEN APPROACH: ICD-10-PCS | Performed by: EMERGENCY MEDICINE

## 2024-01-18 PROCEDURE — 87186 SC STD MICRODIL/AGAR DIL: CPT

## 2024-01-18 PROCEDURE — 6360000002 HC RX W HCPCS: Performed by: STUDENT IN AN ORGANIZED HEALTH CARE EDUCATION/TRAINING PROGRAM

## 2024-01-18 PROCEDURE — 83605 ASSAY OF LACTIC ACID: CPT

## 2024-01-18 PROCEDURE — 85025 COMPLETE CBC W/AUTO DIFF WBC: CPT

## 2024-01-18 PROCEDURE — 82947 ASSAY GLUCOSE BLOOD QUANT: CPT

## 2024-01-18 PROCEDURE — 93010 ELECTROCARDIOGRAM REPORT: CPT | Performed by: INTERNAL MEDICINE

## 2024-01-18 PROCEDURE — 85730 THROMBOPLASTIN TIME PARTIAL: CPT

## 2024-01-18 PROCEDURE — 06HY33Z INSERTION OF INFUSION DEVICE INTO LOWER VEIN, PERCUTANEOUS APPROACH: ICD-10-PCS | Performed by: EMERGENCY MEDICINE

## 2024-01-18 PROCEDURE — 82140 ASSAY OF AMMONIA: CPT

## 2024-01-18 PROCEDURE — 87205 SMEAR GRAM STAIN: CPT

## 2024-01-18 PROCEDURE — 84484 ASSAY OF TROPONIN QUANT: CPT

## 2024-01-18 PROCEDURE — 2000000000 HC ICU R&B

## 2024-01-18 PROCEDURE — 70450 CT HEAD/BRAIN W/O DYE: CPT

## 2024-01-18 PROCEDURE — 73630 X-RAY EXAM OF FOOT: CPT

## 2024-01-18 PROCEDURE — 6360000002 HC RX W HCPCS: Performed by: EMERGENCY MEDICINE

## 2024-01-18 PROCEDURE — 87150 DNA/RNA AMPLIFIED PROBE: CPT

## 2024-01-18 PROCEDURE — 96366 THER/PROPH/DIAG IV INF ADDON: CPT

## 2024-01-18 PROCEDURE — 87804 INFLUENZA ASSAY W/OPTIC: CPT

## 2024-01-18 PROCEDURE — 83735 ASSAY OF MAGNESIUM: CPT

## 2024-01-18 PROCEDURE — 84443 ASSAY THYROID STIM HORMONE: CPT

## 2024-01-18 PROCEDURE — 36556 INSERT NON-TUNNEL CV CATH: CPT

## 2024-01-18 PROCEDURE — 94761 N-INVAS EAR/PLS OXIMETRY MLT: CPT

## 2024-01-18 PROCEDURE — 87070 CULTURE OTHR SPECIMN AEROBIC: CPT

## 2024-01-18 PROCEDURE — 2700000000 HC OXYGEN THERAPY PER DAY

## 2024-01-18 PROCEDURE — 96361 HYDRATE IV INFUSION ADD-ON: CPT

## 2024-01-18 PROCEDURE — 81001 URINALYSIS AUTO W/SCOPE: CPT

## 2024-01-18 PROCEDURE — 36620 INSERTION CATHETER ARTERY: CPT

## 2024-01-18 PROCEDURE — 73590 X-RAY EXAM OF LOWER LEG: CPT

## 2024-01-18 PROCEDURE — 86140 C-REACTIVE PROTEIN: CPT

## 2024-01-18 PROCEDURE — 99285 EMERGENCY DEPT VISIT HI MDM: CPT

## 2024-01-18 PROCEDURE — 96365 THER/PROPH/DIAG IV INF INIT: CPT

## 2024-01-18 PROCEDURE — 2580000003 HC RX 258: Performed by: EMERGENCY MEDICINE

## 2024-01-18 PROCEDURE — 85652 RBC SED RATE AUTOMATED: CPT

## 2024-01-18 RX ORDER — ACETAMINOPHEN 325 MG/1
650 TABLET ORAL EVERY 6 HOURS PRN
Status: DISCONTINUED | OUTPATIENT
Start: 2024-01-18 | End: 2024-01-19 | Stop reason: HOSPADM

## 2024-01-18 RX ORDER — HEPARIN SODIUM 1000 [USP'U]/ML
5900 INJECTION, SOLUTION INTRAVENOUS; SUBCUTANEOUS ONCE
Status: COMPLETED | OUTPATIENT
Start: 2024-01-18 | End: 2024-01-18

## 2024-01-18 RX ORDER — CHOLECALCIFEROL (VITAMIN D3) 125 MCG
500 CAPSULE ORAL DAILY
COMMUNITY

## 2024-01-18 RX ORDER — ACETAMINOPHEN 650 MG/1
650 SUPPOSITORY RECTAL EVERY 6 HOURS PRN
Status: DISCONTINUED | OUTPATIENT
Start: 2024-01-18 | End: 2024-01-19 | Stop reason: HOSPADM

## 2024-01-18 RX ORDER — 0.9 % SODIUM CHLORIDE 0.9 %
30 INTRAVENOUS SOLUTION INTRAVENOUS ONCE
Status: COMPLETED | OUTPATIENT
Start: 2024-01-18 | End: 2024-01-18

## 2024-01-18 RX ORDER — PRAVASTATIN SODIUM 80 MG/1
80 TABLET ORAL
COMMUNITY

## 2024-01-18 RX ORDER — OMEPRAZOLE 20 MG/1
20 CAPSULE, DELAYED RELEASE ORAL DAILY
COMMUNITY

## 2024-01-18 RX ORDER — HEPARIN SODIUM 1000 [USP'U]/ML
80 INJECTION, SOLUTION INTRAVENOUS; SUBCUTANEOUS PRN
Status: DISCONTINUED | OUTPATIENT
Start: 2024-01-18 | End: 2024-01-18

## 2024-01-18 RX ORDER — SODIUM CHLORIDE 0.9 % (FLUSH) 0.9 %
5-40 SYRINGE (ML) INJECTION PRN
Status: DISCONTINUED | OUTPATIENT
Start: 2024-01-18 | End: 2024-01-19

## 2024-01-18 RX ORDER — CHOLECALCIFEROL (VITAMIN D3) 1250 MCG
50000 CAPSULE ORAL WEEKLY
COMMUNITY

## 2024-01-18 RX ORDER — POTASSIUM CHLORIDE 7.45 MG/ML
10 INJECTION INTRAVENOUS PRN
Status: DISCONTINUED | OUTPATIENT
Start: 2024-01-18 | End: 2024-01-19

## 2024-01-18 RX ORDER — POTASSIUM CHLORIDE 29.8 MG/ML
20 INJECTION INTRAVENOUS PRN
Status: DISCONTINUED | OUTPATIENT
Start: 2024-01-18 | End: 2024-01-19

## 2024-01-18 RX ORDER — NOREPINEPHRINE BITARTRATE 0.06 MG/ML
1-100 INJECTION, SOLUTION INTRAVENOUS CONTINUOUS
Status: DISCONTINUED | OUTPATIENT
Start: 2024-01-18 | End: 2024-01-19

## 2024-01-18 RX ORDER — ONDANSETRON 2 MG/ML
4 INJECTION INTRAMUSCULAR; INTRAVENOUS EVERY 6 HOURS PRN
Status: DISCONTINUED | OUTPATIENT
Start: 2024-01-18 | End: 2024-01-19 | Stop reason: HOSPADM

## 2024-01-18 RX ORDER — ONDANSETRON 4 MG/1
4 TABLET, ORALLY DISINTEGRATING ORAL EVERY 8 HOURS PRN
Status: DISCONTINUED | OUTPATIENT
Start: 2024-01-18 | End: 2024-01-19 | Stop reason: HOSPADM

## 2024-01-18 RX ORDER — HEPARIN SODIUM 10000 [USP'U]/100ML
0-4000 INJECTION, SOLUTION INTRAVENOUS CONTINUOUS
Status: DISCONTINUED | OUTPATIENT
Start: 2024-01-18 | End: 2024-01-19

## 2024-01-18 RX ORDER — HEPARIN SODIUM 1000 [USP'U]/ML
40 INJECTION, SOLUTION INTRAVENOUS; SUBCUTANEOUS PRN
Status: DISCONTINUED | OUTPATIENT
Start: 2024-01-18 | End: 2024-01-18

## 2024-01-18 RX ORDER — POTASSIUM CHLORIDE 750 MG/1
10 TABLET, EXTENDED RELEASE ORAL DAILY
COMMUNITY

## 2024-01-18 RX ORDER — ACETAMINOPHEN 325 MG/1
650 TABLET ORAL EVERY 6 HOURS PRN
COMMUNITY

## 2024-01-18 RX ORDER — VANCOMYCIN 1.75 G/350ML
1250 INJECTION, SOLUTION INTRAVENOUS ONCE
Status: COMPLETED | OUTPATIENT
Start: 2024-01-18 | End: 2024-01-18

## 2024-01-18 RX ORDER — SODIUM CHLORIDE 9 MG/ML
INJECTION, SOLUTION INTRAVENOUS PRN
Status: DISCONTINUED | OUTPATIENT
Start: 2024-01-18 | End: 2024-01-19

## 2024-01-18 RX ORDER — SODIUM CHLORIDE, SODIUM LACTATE, POTASSIUM CHLORIDE, CALCIUM CHLORIDE 600; 310; 30; 20 MG/100ML; MG/100ML; MG/100ML; MG/100ML
INJECTION, SOLUTION INTRAVENOUS CONTINUOUS
Status: DISCONTINUED | OUTPATIENT
Start: 2024-01-18 | End: 2024-01-18

## 2024-01-18 RX ORDER — SODIUM CHLORIDE 0.9 % (FLUSH) 0.9 %
5-40 SYRINGE (ML) INJECTION EVERY 12 HOURS SCHEDULED
Status: DISCONTINUED | OUTPATIENT
Start: 2024-01-18 | End: 2024-01-19 | Stop reason: HOSPADM

## 2024-01-18 RX ORDER — ALBUTEROL SULFATE 2.5 MG/3ML
2.5 SOLUTION RESPIRATORY (INHALATION) EVERY 4 HOURS PRN
Status: DISCONTINUED | OUTPATIENT
Start: 2024-01-18 | End: 2024-01-19

## 2024-01-18 RX ORDER — MAGNESIUM SULFATE IN WATER 40 MG/ML
2000 INJECTION, SOLUTION INTRAVENOUS PRN
Status: DISCONTINUED | OUTPATIENT
Start: 2024-01-18 | End: 2024-01-19

## 2024-01-18 RX ORDER — LANOLIN ALCOHOL/MO/W.PET/CERES
3 CREAM (GRAM) TOPICAL NIGHTLY PRN
COMMUNITY

## 2024-01-18 RX ADMIN — Medication 5 MCG/MIN: at 15:46

## 2024-01-18 RX ADMIN — CEFEPIME 2000 MG: 2 INJECTION, POWDER, FOR SOLUTION INTRAVENOUS at 11:35

## 2024-01-18 RX ADMIN — SODIUM BICARBONATE: 84 INJECTION, SOLUTION INTRAVENOUS at 22:00

## 2024-01-18 RX ADMIN — CEFEPIME 2000 MG: 2 INJECTION, POWDER, FOR SOLUTION INTRAVENOUS at 23:28

## 2024-01-18 RX ADMIN — VASOPRESSIN 0.03 UNITS/MIN: 20 INJECTION INTRAVENOUS at 20:08

## 2024-01-18 RX ADMIN — SODIUM CHLORIDE, POTASSIUM CHLORIDE, SODIUM LACTATE AND CALCIUM CHLORIDE: 600; 310; 30; 20 INJECTION, SOLUTION INTRAVENOUS at 18:35

## 2024-01-18 RX ADMIN — HEPARIN SODIUM 5900 UNITS: 1000 INJECTION INTRAVENOUS; SUBCUTANEOUS at 19:03

## 2024-01-18 RX ADMIN — HEPARIN SODIUM 1320 UNITS/HR: 10000 INJECTION, SOLUTION INTRAVENOUS at 19:05

## 2024-01-18 RX ADMIN — SODIUM CHLORIDE 2205 ML: 9 INJECTION, SOLUTION INTRAVENOUS at 10:37

## 2024-01-18 RX ADMIN — VANCOMYCIN 1250 MG: 1.75 INJECTION, SOLUTION INTRAVENOUS at 12:53

## 2024-01-18 RX ADMIN — SODIUM CHLORIDE: 9 INJECTION, SOLUTION INTRAVENOUS at 23:26

## 2024-01-18 ASSESSMENT — LIFESTYLE VARIABLES
HOW OFTEN DO YOU HAVE A DRINK CONTAINING ALCOHOL: PATIENT UNABLE TO ANSWER
HOW MANY STANDARD DRINKS CONTAINING ALCOHOL DO YOU HAVE ON A TYPICAL DAY: PATIENT UNABLE TO ANSWER

## 2024-01-18 NOTE — ED NOTES
Behr hugger warming blanket applied.  Verbal order given by Dr. Dominguez to infuse fluids through fluid warmer.

## 2024-01-18 NOTE — ED NOTES
ED SBAR report provider to NOBLE Holm. Patient to be transported to Room 2122 via stretcher by RN.Patient transported with bedside cardiac monitor and with IV medications infusing. IV site clean, dry, and intact. MEWS score and pain assessed as 0 and documented. Updated patient and family on plan of care.

## 2024-01-18 NOTE — CONSULTS
Clinical Pharmacy Note  Vancomycin Consult    Pharmacy consult received for one-time dose of vancomycin in the Emergency Department per Dr. Dominguez.    Ht Readings from Last 1 Encounters:   01/18/24 1.854 m (6' 1\")        Wt Readings from Last 1 Encounters:   01/18/24 73.5 kg (162 lb 0.6 oz)         Assessment/Plan:  Vancomycin 1250mg x 1 in ED.  If vancomycin is to continue on admission and pharmacy is to manage dosing, please re-consult with admission orders.  Misael Leal [facility], 1/18/2024 11:53 AM

## 2024-01-18 NOTE — ED PROVIDER NOTES
[facility] EMERGENCY DEPARTMENT  EMERGENCY DEPARTMENT ENCOUNTER      Pt Name: Miguel A Munoz  MRN: [id number]  Birthdate 1940  Date of evaluation: 1/18/2024  Provider: AZAR MOORE DO    CHIEF COMPLAINT  Chief Complaint   Patient presents with    Altered Mental Status     Per Nursing \" pt has been declining over the past few days, and more altered than his normal \"           This patient is at risk for a communicable infection.  Therefore, personal protection equipment consisting of a mask was worn for the exam.    HPI  Miguel A Munoz is a 83 y.o. male who presents with altered mental status from nursing home.  Patient cannot say his name but does follow some simple commands.  Daughter last saw him a week ago and he was normal at that time.  ECF reports that he has been there for 3 weeks and has been declining over the past few days.  No other history is available.  Daughter can give minimal history who is here at the bedside and does not know of any new medications recently.  Patient is DNR [facility]  ?  REVIEW OF SYSTEMS  All systems negative except as noted in the HPI.  Reviewed Nurses' notes and concur.   History limited due to patient condition and nonverbal and noncommunicating.    No LMP for male patient.    PAST MEDICAL HISTORY  Past Medical History:   Diagnosis Date    Atrial fibrillation (HCC)     BPH (benign prostatic hyperplasia)     Central retinal artery occlusion     Diabetes mellitus (HCC)     Diabetic nephropathy (HCC)     Diabetic retinopathy (HCC)     DVT (deep venous thrombosis) (HCC)     Falls frequently     Hearing loss     Hyperlipidemia     Hypertension     Hypertensive heart disease without heart failure        FAMILY HISTORY  History reviewed. No pertinent family history.    SOCIAL HISTORY   reports that he has been smoking cigarettes. He has never used smokeless tobacco. He reports that he does not currently use alcohol. He reports that he does not currently use  0.1 (*)     All other components within normal limits   COMPREHENSIVE METABOLIC PANEL W/ REFLEX TO MG FOR LOW K - Abnormal; Notable for the following components:    Sodium 146 (*)     CO2 20 (*)     Glucose 188 (*)     BUN 43 (*)     Creatinine 1.5 (*)     Est, Glom Filt Rate 46 (*)     Calcium 8.2 (*)     Total Protein 6.0 (*)     Albumin 2.2 (*)     Albumin/Globulin Ratio 0.6 (*)     All other components within normal limits   LACTIC ACID - Abnormal; Notable for the following components:    Lactic Acid 2.3 (*)     All other components within normal limits   TROPONIN - Abnormal; Notable for the following components:    Troponin, High Sensitivity 80 (*)     All other components within normal limits   URINALYSIS WITH REFLEX TO CULTURE - Abnormal; Notable for the following components:    Color, UA DARK YELLOW (*)     Clarity, UA CLOUDY (*)     Glucose, Ur >=1000 (*)     Ketones, Urine TRACE (*)     Blood, Urine SMALL (*)     Protein, UA TRACE (*)     Leukocyte Esterase, Urine SMALL (*)     All other components within normal limits   MICROSCOPIC URINALYSIS - Abnormal; Notable for the following components:    WBC, UA 8 (*)     RBC, UA 21 (*)     Epithelial Cells, UA 7 (*)     Amorphous, UA Present (*)     Mucus, UA Present (*)     All other components within normal limits   TROPONIN - Abnormal; Notable for the following components:    Troponin, High Sensitivity 72 (*)     All other components within normal limits   COVID-19, RAPID   RAPID INFLUENZA A/B ANTIGENS   STREP SCREEN GROUP A THROAT   CULTURE, BLOOD 1   CULTURE, BLOOD 2   CULTURE, WOUND   CULTURE, BETA STREP CONFIRM PLATES   LACTIC ACID   TSH WITH REFLEX   AMMONIA   POCT GLUCOSE       EKG  EKG Interpretation #1    Interpreted by emergency department physician  Time performed: 0927  Time read: 0935    Rhythm: Bradycardia with undetermined rhythm  Ventricular Rate: 52  QRS Axis: -18  Ectopy: None  Conduction: Junctional bradycardia with undetermined rhythm but

## 2024-01-18 NOTE — ED NOTES
Pt to ED from [facility] via [facility] d/t AMS.  Per EMS report, nursing home staff report hx of dementia, state that pt was admitted to the facility 3 weeks ago and has been declining over the last few days. Daughter at bedside, states that pt is confused but verbal at baseline.  Daughter states pt was at baseline mental status when she last spoke to him 1 week ago.  Upon ED arrival, pt lethargic, non-verbal, will follow simple commands.  Pt placed on cardiac monitor, hr in the 50's, pt hypotensive with b/p 80's/50's, tachypneic, o2 sats 96% on ra.  Skin cool to touch, rectal temp attempted and not registering a reading.  Wounds to BLE and R foot with dressing and wrap in place by nursing home. ED MD Dominguez at bedside.

## 2024-01-18 NOTE — ED NOTES
Nursing home dressings removed from BLE.  BLE are cool from mid calf down to feet, unable to palpate pulse. Wounds noted to RLE, right foot, and left medial calf with purulent drainage present.  Wound care provided, areas covered with non-adherent dressing and wrapped with Kerlix as ordered by provider.  Pt tolerated well.  ED MD aware and at bedside.

## 2024-01-19 ENCOUNTER — APPOINTMENT (OUTPATIENT)
Dept: GENERAL RADIOLOGY | Age: 84
End: 2024-01-19
Payer: OTHER GOVERNMENT

## 2024-01-19 VITALS
TEMPERATURE: 99.2 F | OXYGEN SATURATION: 88 % | DIASTOLIC BLOOD PRESSURE: 87 MMHG | BODY MASS INDEX: 23.58 KG/M2 | HEIGHT: 73 IN | WEIGHT: 177.91 LBS | SYSTOLIC BLOOD PRESSURE: 111 MMHG

## 2024-01-19 LAB
ALBUMIN SERPL-MCNC: 2.2 G/DL (ref 3.4–5)
ALBUMIN/GLOB SERPL: 0.7 {RATIO} (ref 1.1–2.2)
ALP SERPL-CCNC: 120 U/L (ref 40–129)
ALT SERPL-CCNC: 25 U/L (ref 10–40)
ANION GAP SERPL CALCULATED.3IONS-SCNC: 22 MMOL/L (ref 3–16)
APTT BLD: >180 SEC (ref 22.7–35.9)
APTT BLD: >180 SEC (ref 22.7–35.9)
AST SERPL-CCNC: 41 U/L (ref 15–37)
BASOPHILS # BLD: 0 K/UL (ref 0–0.2)
BASOPHILS NFR BLD: 0 %
BILIRUB SERPL-MCNC: 0.6 MG/DL (ref 0–1)
BUN SERPL-MCNC: 51 MG/DL (ref 7–20)
BURR CELLS BLD QL SMEAR: ABNORMAL
CALCIUM SERPL-MCNC: 7.6 MG/DL (ref 8.3–10.6)
CHLORIDE SERPL-SCNC: 113 MMOL/L (ref 99–110)
CO2 SERPL-SCNC: 17 MMOL/L (ref 21–32)
CREAT SERPL-MCNC: 2.2 MG/DL (ref 0.8–1.3)
DEPRECATED RDW RBC AUTO: 17.4 % (ref 12.4–15.4)
EOSINOPHIL # BLD: 0 K/UL (ref 0–0.6)
EOSINOPHIL NFR BLD: 0 %
GFR SERPLBLD CREATININE-BSD FMLA CKD-EPI: 29 ML/MIN/{1.73_M2}
GLUCOSE SERPL-MCNC: 97 MG/DL (ref 70–99)
HCT VFR BLD AUTO: 40.2 % (ref 40.5–52.5)
HGB BLD-MCNC: 12.8 G/DL (ref 13.5–17.5)
LACTATE BLDV-SCNC: 8.6 MMOL/L (ref 0.4–2)
LYMPHOCYTES # BLD: 0 K/UL (ref 1–5.1)
LYMPHOCYTES NFR BLD: 0 %
MAGNESIUM SERPL-MCNC: 2.2 MG/DL (ref 1.8–2.4)
MCH RBC QN AUTO: 27.3 PG (ref 26–34)
MCHC RBC AUTO-ENTMCNC: 31.9 G/DL (ref 31–36)
MCV RBC AUTO: 85.4 FL (ref 80–100)
MONOCYTES # BLD: 0 K/UL (ref 0–1.3)
MONOCYTES NFR BLD: 0 %
MRSA DNA SPEC QL NAA+PROBE: NORMAL
NEUTROPHILS # BLD: 19.5 K/UL (ref 1.7–7.7)
NEUTROPHILS NFR BLD: 89 %
NEUTS BAND NFR BLD MANUAL: 11 % (ref 0–7)
OVALOCYTES BLD QL SMEAR: ABNORMAL
PHOSPHATE SERPL-MCNC: 6.3 MG/DL (ref 2.5–4.9)
PLATELET # BLD AUTO: 122 K/UL (ref 135–450)
PMV BLD AUTO: 10.5 FL (ref 5–10.5)
POIKILOCYTOSIS BLD QL SMEAR: ABNORMAL
POTASSIUM SERPL-SCNC: 4.4 MMOL/L (ref 3.5–5.1)
PROT SERPL-MCNC: 5.4 G/DL (ref 6.4–8.2)
RBC # BLD AUTO: 4.7 M/UL (ref 4.2–5.9)
REPORT: NORMAL
SLIDE REVIEW: ABNORMAL
SODIUM SERPL-SCNC: 152 MMOL/L (ref 136–145)
VANCOMYCIN SERPL-MCNC: 12.3 UG/ML
WBC # BLD AUTO: 19.5 K/UL (ref 4–11)

## 2024-01-19 PROCEDURE — 2580000003 HC RX 258: Performed by: STUDENT IN AN ORGANIZED HEALTH CARE EDUCATION/TRAINING PROGRAM

## 2024-01-19 PROCEDURE — 2700000000 HC OXYGEN THERAPY PER DAY

## 2024-01-19 PROCEDURE — 83605 ASSAY OF LACTIC ACID: CPT

## 2024-01-19 PROCEDURE — 6360000002 HC RX W HCPCS: Performed by: NURSE PRACTITIONER

## 2024-01-19 PROCEDURE — 36592 COLLECT BLOOD FROM PICC: CPT

## 2024-01-19 PROCEDURE — 83735 ASSAY OF MAGNESIUM: CPT

## 2024-01-19 PROCEDURE — 71045 X-RAY EXAM CHEST 1 VIEW: CPT

## 2024-01-19 PROCEDURE — 2580000003 HC RX 258: Performed by: NURSE PRACTITIONER

## 2024-01-19 PROCEDURE — 87641 MR-STAPH DNA AMP PROBE: CPT

## 2024-01-19 PROCEDURE — 6360000002 HC RX W HCPCS: Performed by: HOSPITALIST

## 2024-01-19 PROCEDURE — 99221 1ST HOSP IP/OBS SF/LOW 40: CPT | Performed by: NURSE PRACTITIONER

## 2024-01-19 PROCEDURE — 84100 ASSAY OF PHOSPHORUS: CPT

## 2024-01-19 PROCEDURE — 85025 COMPLETE CBC W/AUTO DIFF WBC: CPT

## 2024-01-19 PROCEDURE — 80202 ASSAY OF VANCOMYCIN: CPT

## 2024-01-19 PROCEDURE — 80053 COMPREHEN METABOLIC PANEL: CPT

## 2024-01-19 PROCEDURE — 85730 THROMBOPLASTIN TIME PARTIAL: CPT

## 2024-01-19 PROCEDURE — 94761 N-INVAS EAR/PLS OXIMETRY MLT: CPT

## 2024-01-19 PROCEDURE — 2500000003 HC RX 250 WO HCPCS: Performed by: EMERGENCY MEDICINE

## 2024-01-19 RX ORDER — MORPHINE SULFATE 2 MG/ML
2 INJECTION, SOLUTION INTRAMUSCULAR; INTRAVENOUS
Status: DISCONTINUED | OUTPATIENT
Start: 2024-01-19 | End: 2024-01-19 | Stop reason: HOSPADM

## 2024-01-19 RX ORDER — LORAZEPAM 2 MG/ML
1 INJECTION INTRAMUSCULAR
Status: DISCONTINUED | OUTPATIENT
Start: 2024-01-19 | End: 2024-01-19 | Stop reason: HOSPADM

## 2024-01-19 RX ADMIN — Medication 55 MCG/MIN: at 00:05

## 2024-01-19 RX ADMIN — MORPHINE SULFATE 2 MG: 2 INJECTION, SOLUTION INTRAMUSCULAR; INTRAVENOUS at 11:12

## 2024-01-19 RX ADMIN — LORAZEPAM 1 MG: 2 INJECTION INTRAMUSCULAR; INTRAVENOUS at 11:13

## 2024-01-19 RX ADMIN — Medication 80 MCG/MIN: at 05:04

## 2024-01-19 RX ADMIN — VASOPRESSIN 0.04 UNITS/MIN: 20 INJECTION INTRAVENOUS at 04:23

## 2024-01-19 RX ADMIN — Medication 10 ML: at 08:30

## 2024-01-19 NOTE — DISCHARGE SUMMARY
HISTORY: Ulcerations and cold left foot from mid calf TECHNOLOGIST PROVIDED HISTORY: Reason for exam:->Ulcerations and cold left foot from mid calf Reason for Exam: Ulcerations and cold left foot from mid calf FINDINGS: Left foot: Metatarsal alignment appears normal..  Scattered soft tissue swelling is seen.  No definite osteomyelitis..  Scattered degenerative changes are seen Left tibia and fibula Knee arthroplasty is identified with components in their expected location. Soft tissue swelling is seen.  Vascular calcifications are seen.  No definite osteomyelitis. Right foot: Soft tissue swelling surrounds the hallux.  Small amount of soft tissue gas is seen in this region.  There is questionable cortical irregularity of the tip of the distal phalanx of the hallux. Scattered areas of degenerative change are seen in the foot. Right tibia and fibula Vascular calcifications are seen.  Soft tissue swelling is seen.  Right knee arthroplasty is identified with components in their expected location.  No definite osteomyelitis..  Crescentic calcifications are seen posterior to the knee joint.  Joint effusion is suspected at the right knee joint.     Soft tissue swelling and soft tissue gas surrounds the distal phalanx of the hallux on the right.  There is cortical irregularity of the distal phalanx suggesting underlying osteomyelitis Crescentic soft tissue calcifications are seen posterior to the knee joint on the right, of unclear etiology.  This could represent calcified vesseles, calcified synovium, dystrophic soft tissue calcification, or less likely aneurysm.  Joint effusion is suspected in the right knee joint. Soft tissue swelling left tibia and fibula as well as left foot.  No definite osteomyelitis on the left.     XR TIBIA FIBULA RIGHT (2 VIEWS)    Result Date: 1/18/2024  EXAMINATION: THREE XRAY VIEWS OF THE LEFT FOOT; 3 XRAY VIEWS OF THE RIGHT FOOT; TWO XRAY VIEWS OF THE RIGHT TIBIA/FIBULA; 2 XRAY VIEWS OF THE  LEFT TIBIA AND FIBULA 1/18/2024 12:52 pm COMPARISON: None. HISTORY: ORDERING SYSTEM PROVIDED HISTORY: Ulcerations and cold left foot from mid calf TECHNOLOGIST PROVIDED HISTORY: Reason for exam:->Ulcerations and cold left foot from mid calf Reason for Exam: Ulcerations and cold left foot from mid calf FINDINGS: Left foot: Metatarsal alignment appears normal..  Scattered soft tissue swelling is seen.  No definite osteomyelitis..  Scattered degenerative changes are seen Left tibia and fibula Knee arthroplasty is identified with components in their expected location. Soft tissue swelling is seen.  Vascular calcifications are seen.  No definite osteomyelitis. Right foot: Soft tissue swelling surrounds the hallux.  Small amount of soft tissue gas is seen in this region.  There is questionable cortical irregularity of the tip of the distal phalanx of the hallux. Scattered areas of degenerative change are seen in the foot. Right tibia and fibula Vascular calcifications are seen.  Soft tissue swelling is seen.  Right knee arthroplasty is identified with components in their expected location.  No definite osteomyelitis..  Crescentic calcifications are seen posterior to the knee joint.  Joint effusion is suspected at the right knee joint.     Soft tissue swelling and soft tissue gas surrounds the distal phalanx of the hallux on the right.  There is cortical irregularity of the distal phalanx suggesting underlying osteomyelitis Crescentic soft tissue calcifications are seen posterior to the knee joint on the right, of unclear etiology.  This could represent calcified vesseles, calcified synovium, dystrophic soft tissue calcification, or less likely aneurysm.  Joint effusion is suspected in the right knee joint. Soft tissue swelling left tibia and fibula as well as left foot.  No definite osteomyelitis on the left.     XR FOOT LEFT (MIN 3 VIEWS)    Result Date: 1/18/2024  EXAMINATION: THREE XRAY VIEWS OF THE LEFT FOOT; 3 XRAY  01/18/2024 10:34 AM     Gram stain Aerobic bottle:  Gram positive cocci in clusters  resembling Staphylococcus  Information to follow  Gram positive cocci in chains and/or pairs  resembling Streptococcus  Information to follow  Gram stain Anaerobic bottle:  Gram positive cocci  Information to follow       Lab Results   Component Value Date/Time    BLOODCULT2  01/18/2024 10:34 AM     Gram stain Aerobic bottle:  Gram positive cocci in clusters  resembling Staphylococcus  Information to follow  Gram positive cocci in chains and/or pairs  resembling Streptococcus  Information to follow  Gram stain Anaerobic bottle:  Gram positive cocci  Information to follow      BLOODCULT2  01/18/2024 10:34 AM     mecA gene not detected  See additional report for complete BCID panel.      BLOODCULT2 See additional report for complete BCID panel. 01/18/2024 10:34 AM     Organism:   Lab Results   Component Value Date/Time    ORG Staph aureus DNA Detected 01/18/2024 10:34 AM    ORG Streptococcus species DNA Detected 01/18/2024 10:34 AM       Time Spent Discharging patient 32 minutes    Electronically signed by Ryley Trujillo MD on 1/19/2024 at 11:59 AM

## 2024-01-19 NOTE — CONSULTS
Clinical Pharmacy Note  Vancomycin Consult    Miguel A Munoz is a 83 y.o. male ordered vancomycin for osteomyelitis; consult received from Dr. Mancilla to manage therapy. Also receiving cefepime.    Allergies:  Patient has no known allergies.     Temp max:  Temp (24hrs), Av °F (31.7 °C), Min:87.5 °F (30.8 °C), Max:90.5 °F (32.5 °C)      Recent Labs     24  1034   WBC 18.6*       Recent Labs     24  1034 24  1836   BUN 43* 45*   CREATININE 1.5* 1.8*         Intake/Output Summary (Last 24 hours) at 2024 1919  Last data filed at 2024 1853  Gross per 24 hour   Intake 86.7 ml   Output 650 ml   Net -563.3 ml       Culture Results:  Pending    Ht Readings from Last 1 Encounters:   24 1.854 m (6' 1\")        Wt Readings from Last 1 Encounters:   24 73.5 kg (162 lb 0.6 oz)         Estimated Creatinine Clearance: 32 mL/min (A) (based on SCr of 1.8 mg/dL (H)).    Assessment/Plan:  Day # 1 of vancomycin.  Patient received vancomycin 1250 mg IV x 1 in the ED. With creatinine elevated, will order a random level for tomorrow AM.      Thank you for the consult.     Andrzej Caldera ANDREIA  2024 7:20 PM

## 2024-01-19 NOTE — CONSULTS
[facility]  Palliative Care   Consult Note    NAME:  Miguel A Munoz  MEDICAL RECORD NUMBER:  [id number]  AGE: 83 y.o.   GENDER: male  : 1940  TODAY'S DATE:  2024    Subjective     Reason for Consult:  goals of care and code status  Visit Type: Initial Consult      Miguel A Munoz is a 83 y.o. male referred by:   [x] Physician    PAST MEDICAL HISTORY      Diagnosis Date    Atrial fibrillation (HCC)     BPH (benign prostatic hyperplasia)     Central retinal artery occlusion     Diabetes mellitus (HCC)     Diabetic nephropathy (HCC)     Diabetic retinopathy (HCC)     DVT (deep venous thrombosis) (HCC)     Falls frequently     Hearing loss     Hyperlipidemia     Hypertension     Hypertensive heart disease without heart failure        PAST SURGICAL HISTORY  Past Surgical History:   Procedure Laterality Date    JOINT REPLACEMENT         FAMILY HISTORY  History reviewed. No pertinent family history.    SOCIAL HISTORY  Social History     Tobacco Use    Smoking status: Some Days     Current packs/day: 0.30     Types: Cigarettes    Smokeless tobacco: Never   Substance Use Topics    Alcohol use: Not Currently    Drug use: Not Currently       ALLERGIES  No Known Allergies    MEDICATIONS  No current facility-administered medications on file prior to encounter.     Current Outpatient Medications on File Prior to Encounter   Medication Sig Dispense Refill    acetaminophen (TYLENOL) 325 MG tablet Take 2 tablets by mouth every 6 hours as needed for Pain (1-6 level)      mineral oil-hydrophilic petrolatum (AQUAPHOR) ointment Apply topically in the morning and at bedtime Ankles and feet      Cholecalciferol (VITAMIN D3) 1.25 MG (30110 UT) CAPS Take 1 capsule by mouth once a week Monday      vitamin B-12 (CYANOCOBALAMIN) 500 MCG tablet Take 1 tablet by mouth daily      melatonin 3 MG TABS tablet Take 1 tablet by mouth nightly as needed (sleep)      metoprolol tartrate (LOPRESSOR) 25 MG tablet Take 0.5

## 2024-01-19 NOTE — DEATH NOTES
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

## 2024-01-19 NOTE — CONSULTS
medial and posterior shin wounds noted - no significant drainage noted to dressings, wounds appear superficial and without evidence of purulence or infection  -LLE - foot cool to touch, unable to assess pain, sensation or motor function d/t pt condition, anterior and posterior shin wounds noted - no significant drainage noted to dressings, wounds appear superficial and without evidence of purulence or infection    Pulses:  -R DP: unable to appreciate doppler signal  -L DP: unable to appreciate doppler signal  -R PT: unable to appreciate doppler signal  -L PT: doppler signal present      Labs  Lab Results   Component Value Date/Time    WBC 19.5 01/19/2024 04:22 AM    HGB 12.8 01/19/2024 04:22 AM    HCT 40.2 01/19/2024 04:22 AM    MCV 85.4 01/19/2024 04:22 AM     01/19/2024 04:22 AM     Lab Results   Component Value Date/Time     01/19/2024 04:22 AM    K 4.4 01/19/2024 04:22 AM     01/19/2024 04:22 AM    CO2 17 01/19/2024 04:22 AM    PHOS 6.3 01/19/2024 04:22 AM    BUN 51 01/19/2024 04:22 AM    CREATININE 2.2 01/19/2024 04:22 AM      No results found for: \"GLU\"    Scheduled Meds:    cefepime  1,000 mg IntraVENous Q24H    sodium chloride flush  5-40 mL IntraVENous 2 times per day    vancomycin (VANCOCIN) intermittent dosing (placeholder)   Other RX Placeholder     Continuous Infusions:    phenylephrine (LOUIS-SYNEPHRINE) 50 mg in sodium chloride 0.9 % 250 mL infusion      norepinephrine 90 mcg/min (01/19/24 0831)    sodium chloride 5 mL/hr at 01/19/24 0831    vasopressin 0.04 Units/min (01/19/24 0831)    heparin (PORCINE) Infusion 1,100 Units/hr (01/19/24 0831)    sodium bicarbonate 150 mEq in dextrose 5 % 1,000 mL infusion 100 mL/hr at 01/19/24 0831       Imaging:   Bilateral Tib/Fib and Foot X-ray - 1/18/2024  IMPRESSION:  Soft tissue swelling and soft tissue gas surrounds the distal phalanx of the hallux on the right.  There is cortical irregularity of the distal phalanx suggesting underlying  osteomyelitis     Crescentic soft tissue calcifications are seen posterior to the knee joint on the right, of unclear etiology. This could represent calcified vesseles, calcified synovium, dystrophic soft tissue calcification, or less likely aneurysm. Joint effusion is suspected in the right knee joint.     Soft tissue swelling left tibia and fibula as well as left foot. No definite osteomyelitis on the left.    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 and [facility]  -DNR-CC   -Pt's daughter endorses pt made wishes known to her and in writing that he did not want any surgical procedures  -Hemodynamically unstable requiring 2 pressors with the initiation of a third  -15L High Flow Nasal Cannula  -Blood Cultures positive    Plan:   -Given pt's critical condition and per his/his daughters wishes, would not anticipate offering this patient/his daughter surgical intervention for his RLE which is likely ischemic based on clinical examination  -Agree with Palliative Care Consult    All pertinent information and plan of care discussed with Dr. Gume Urias.    All questions and concerns were addressed with the patient. I have discussed the above stated plan with the patient and the nurse. The patient verbalized understanding and agreed with the plan.    Thank you for allowing to us to participate in the care of [facility]      Electronically signed by GEN Grier CNP on 1/19/2024 at 8:32 AM    Vascular Staff    I independently performed an evaluation on [facility].  I have reviewed the above documentation completed by DONITA Grier.  Please see my additional contributions to the HPI, physical exam, assessment, and medical decision making.    83 M with R great toe gangrene and R foot ischemia.      Patient DNR CC and family states patient

## 2024-01-19 NOTE — H&P
V2.0  History and Physical      Name:  Miguel A Munoz /Age/Sex: 1940  (83 y.o. male)   MRN & CSN:  [id number] & [id number] Encounter Date/Time: 2024 8:41 PM EST   Location:  [id number] PCP: Unknown, Provider, DO       Hospital Day: 1    Assessment and Plan:   Miguel A Munoz is a 83 y.o. male with pmh of hypertension, DVT on Eliquis, peripheral vascular disease who presents to the ED from extended-care facility due to concern for altered mental status.    Hospital Problems             Last Modified POA    * (Principal) Septic shock (HCC) 2024 Yes       Septic shock likely secondary to right lower extremity osteomyelitis with gangrene  Concern for critical limb ischemia  Acute encephalopathy  Acute kidney injury  History of DVT  - Patient presenting with altered mental status, hypotension, hypothermia, bradycardia.  Foot x-ray showed soft tissue swelling and soft tissue gas surrounding the distal phalanx of the right hallux, there is cortical irregularity suggesting osteomyelitis.  - Femoral central line placed in ED, started on IV Levophed, vasopressin, consult intensivist.  Blood cultures, wound cultures ordered, started on IV vancomycin and cefepime.  Podiatry consult placed.  - Arterial Dopplers ordered, vascular surgery consult placed, patient started on IV heparin, hold home Eliquis.  - Has received 30 cc/kg IV normal saline, will continue IV fluid rehydration with sodium bicarbonate, consider nephrology consult if renal function continues to worsen.  - Goals of care discussed with family, has living will, they do not want CPR, intubation, DNRCC order placed.        Disposition:   Current Living situation: ECF  Expected Disposition: ECF  Estimated D/C: 4 days    Diet Diet NPO   DVT Prophylaxis [] Lovenox, [x]  Heparin, [] SCDs, [] Ambulation,  [] Eliquis, [] Xarelto, [] Coumadin   Code Status DNR-CC   Surrogate Decision Maker/ POA Daughter     Personally reviewed Lab Studies and  Bladder   SpO2: 95% 94% (!) 88% 95%   Weight:       Height:           Medications Prior to Admission     Prior to Admission medications    Medication Sig Start Date End Date Taking? Authorizing Provider   acetaminophen (TYLENOL) 325 MG tablet Take 2 tablets by mouth every 6 hours as needed for Pain (1-6 level)   Yes Troy Adams MD   mineral oil-hydrophilic petrolatum (AQUAPHOR) ointment Apply topically in the morning and at bedtime Ankles and feet   Yes Troy Adams MD   Cholecalciferol (VITAMIN D3) 1.25 MG (63549 UT) CAPS Take 1 capsule by mouth once a week Monday   Yes Troy Adams MD   vitamin B-12 (CYANOCOBALAMIN) 500 MCG tablet Take 1 tablet by mouth daily   Yes Troy Adams MD   melatonin 3 MG TABS tablet Take 1 tablet by mouth nightly as needed (sleep)   Yes Troy Adams MD   metoprolol tartrate (LOPRESSOR) 25 MG tablet Take 0.5 tablets by mouth daily Hold for SBP < 110 DBP < 60 or HR < 60   Yes Troy Adams MD   omeprazole (PRILOSEC) 20 MG delayed release capsule Take 1 capsule by mouth daily   Yes Troy Adams MD   potassium chloride (KLOR-CON M) 10 MEQ extended release tablet Take 1 tablet by mouth daily   Yes Troy Adams MD   pravastatin (PRAVACHOL) 80 MG tablet Take 1 tablet by mouth nightly   Yes Troy Adams MD   apixaban (ELIQUIS) 5 MG TABS tablet Take 1 tablet by mouth 2 times daily Take two tablets by mouth twice a day for 3 days.    Troy Adams MD   empagliflozin (JARDIANCE) 25 MG tablet Take 1 tablet by mouth daily    Troy Adams MD   folic acid (FOLVITE) 1 MG tablet Take 1 tablet by mouth daily    Troy Adams MD   hydrALAZINE (APRESOLINE) 10 MG tablet Take 0.5 tablets by mouth 3 times daily Hold for SBP < 110 DBP < 60 or HR < 60    Troy Adams MD   insulin glargine (LANTUS) 100 UNIT/ML injection vial Inject 10 Units into the skin nightly    Troy Adams MD

## 2024-01-20 LAB — S PYO THROAT QL CULT: NORMAL

## 2024-01-21 LAB
BACTERIA BLD CULT ORG #2: ABNORMAL
BACTERIA BLD CULT: ABNORMAL
BACTERIA BLD CULT: ABNORMAL
BACTERIA SPEC AEROBE CULT: ABNORMAL
GRAM STN SPEC: ABNORMAL
ORGANISM: ABNORMAL

## 2024-02-08 NOTE — PROGRESS NOTES
01/19/24 1039   Encounter Summary   Encounter Overview/Reason  Spiritual/Emotional Needs   Service Provided For: Family   Referral/Consult From: Palliative Care   Support System Children   Last Encounter  01/19/24  (Support to family at [facility] of care, awaiting more family for prayer SM)   Complexity of Encounter Moderate   Begin Time 1020   End Time  1040   Total Time Calculated 20 min   Spiritual/Emotional needs   Type Spiritual Support   Grief, Loss, and Adjustments   Type End of Life   Assessment/Intervention/Outcome   Assessment Calm;Coping   Intervention Discussed belief system/Sikh practices/nancy;Sustaining Presence/Ministry of presence   Outcome Expressed feelings, needs, and concerns       
1112: All family at bedside. Family stated they are ready to turn off BP support and withdraw care. This RN explained that comfort care medications (morphine and ativan) will be given and pressure support stopped. All family in agreement.   
1146 pt asystole on monitor. 1154 Dr. Ryley Trujillo notified and coming to bedside.   
4 Eyes Skin Assessment     NAME:  Miguel A Munoz  YOB: 1940  MEDICAL RECORD NUMBER:  [id number]    The patient is being assessed for  Admission, Shift handoff    I agree that at least one RN has performed a thorough Head to Toe Skin Assessment on the patient. ALL assessment sites listed below have been assessed.      Areas assessed by both nurses:    Head, Face, Ears, Shoulders, Back, Chest, Arms, Elbows, Hands, Sacrum. Buttock, Coccyx, Ischium, Legs. Feet and Heels, and Under Medical Devices         Does the Patient have a Wound? Yes wound(s) were present on assessment. LDA wound assessment was Initiated and completed by RN       Raheem Prevention initiated by RN: Yes  Wound Care Orders initiated by RN: Yes    Pressure Injury (Stage 3,4, Unstageable, DTI, NWPT, and Complex wounds) if present, place Wound referral order by RN under : Yes    New Ostomies, if present place, Ostomy referral order under : No     Nurse 1 eSignature: Electronically signed by Ezequiel Spicer RN on 1/18/24 at 8:59 PM EST    **SHARE this note so that the co-signing nurse can place an eSignature**    Nurse 2 eSignature: Electronically signed by Jessica Evans RN on 1/18/24 at 9:00 PM EST    
Assessment complete, VSS on levophed and vasopressin. Pt DNR-CC status with exception of pressors. Respirations tachypneic and shallow on 5L via nasal cannula.   Ulcers present to both lower extremities and right great toe were dressed in vaseline gauze and kerlix. These were replaced with the same materials. Wound care consult is ordered.   ART line in place to right radial with appropriate waveform, leveled, flushed, and zeroed.   Warming blanket in place with bladder temperature 94 F and gradually improving.   
Body preparation complete. Pt being transported to [facility] with belongings (ear muffs and t-shirt).   
CXR reviewed by hospitalist. Bicarb gtt continued. ART line now has a dampened waveform, reading 85/60 (MAP 71), but BP cuff pressures remain normal, with most recent result reading 107/86 (MAP 92).   
Clinical Pharmacy Note  Heparin Dosing       Lab Results   Component Value Date/Time    APTT >180.0 01/19/2024 12:55 AM     Lab Results   Component Value Date/Time    HGB 13.1 01/18/2024 10:34 AM    HCT 41.8 01/18/2024 10:34 AM     01/18/2024 10:34 AM       Current Infusion Rate: 1320 units/hr    Plan:  Hold for 1 hour  Rate: 1100 units/hr  Next aPTT: 0800 01/19/24    Pharmacy will continue to monitor and adjust based on aPTT results.     Carlene Wilson, PharmD    
Clinical Pharmacy Note  Heparin Dosing Consult    Miguel A Munoz is a 83 y.o. male ordered heparin per VTE/DVT/PE Nomogram by Dr. Mancilla.    Lab Results   Component Value Date/Time    APTT 41.8 01/18/2024 06:36 PM     Lab Results   Component Value Date/Time    HGB 13.1 01/18/2024 10:34 AM    HCT 41.8 01/18/2024 10:34 AM     01/18/2024 10:34 AM       Ht Readings from Last 1 Encounters:   01/18/24 1.854 m (6' 1\")        Wt Readings from Last 1 Encounters:   01/18/24 73.5 kg (162 lb 0.6 oz)        Assessment/Plan:  Initial bolus: 5900 units  Initial infusion rate: 1320 units/hr  Next aPTT: 0100   1/19/24    Pharmacy will continue to monitor adjust heparin based on aPTT results using nomogram below:     VTE/DVT/PE Heparin Nomogram     Initial Bolus: 80 units/kg Max Bolus: 10,000 units       Initial Rate: 18 units/kg/hr Max Initial Rate: 2,100 units/hr     aPTT < 59    Heparin 80 units/kg bolus Increase infusion by 4 units/kg/hr        (maximum 10,000 units)   aPTT 59-72.9    Heparin 40 units/kg bolus Increase infusion by 2 units/kg/hr        (maximum 5,000 units)   aPTT      No bolus   No change   aPTT 102.1-109  No bolus   Decrease infusion by 1 units/kg/hr   aPTT 109.1-122.9   No bolus   Decrease infusion by 2 units/kg/hr   aPTT > 123     Hold heparin for 1 hour Decrease infusion by 3 units/kg/hr    Obtain aPTT 6 hours after initial bolus and 6 hours after any dose change until two consecutive therapeutic aPTTs are achieved - then daily.    Andrzej Caldera, [facility]  1/18/2024 8:05 PM    
Difficulty getting an accurate pleth from pulse oximetry. Multiple fingers and earlobe were attempted. ABG drawn from ART line, reads SAO2 91.7% with Pt wearing 15L via high flow nasal cannula.   
Forehead pulse ox applied to Pt, able to get a more accurate reading.   
Palliative care       Spoke with Dilcia Stephen bedside nurse. She stated patient is going to be admitted discussed code status and difference between DNR CCA and Limited.  Per nurse family does not want chest compressions/shocking, intubation or resuscitative meds ok with pressors. We discussed DNR CCA means patient could get intubation for resp distress. She again verified family did not want this for his care.    I will follow up with patient and family tomorrow.     Electronically signed by Cecelia WALLER, RN, CHPN on 1/18/2024 at 4:11 PM  Palliative Care Nurse  Phone [phone]   
Pt's lungs sounding very rhonchorous. Hospitalist messaged. Bicarb gtt paused, stat CXR obtained, awaiting results.   
Somnolent but arousable, limited due to AMS,   per H&P \"Acute encephalopathy Acute kidney injury - Patient presenting with   altered mental status, hypotension, hypothermia, bradycardia.\" per DC Summary   \" presents to the ED from extended-care facility due to concern for altered   mental status.  Patient is confused, somnolent, history is obtained from   daughter - Per daughter, patient's last known well was yesterday, today she   was notified by his nursing facility and patient  Treatment: On Admit CT Head, ICU admit, IV antibiotics, Pressors  Options provided:  -- Metabolic encephalopathy  -- Septic encephalopathy  -- Other - I will add my own diagnosis  -- Disagree - Not applicable / Not valid  -- Disagree - Clinically unable to determine / Unknown  -- Refer to Clinical Documentation Reviewer    PROVIDER RESPONSE TEXT:    This patient has septic encephalopathy.    Query created by: Denisse Gavin on 2/7/2024 4:36 PM      Electronically signed by:  Ryley Trujillo MD 2/8/2024 4:30 PM